# Patient Record
Sex: FEMALE | Race: WHITE | Employment: UNEMPLOYED | URBAN - METROPOLITAN AREA
[De-identification: names, ages, dates, MRNs, and addresses within clinical notes are randomized per-mention and may not be internally consistent; named-entity substitution may affect disease eponyms.]

---

## 2018-10-04 ENCOUNTER — APPOINTMENT (OUTPATIENT)
Dept: CT IMAGING | Age: 28
End: 2018-10-04
Payer: MEDICAID

## 2018-10-04 ENCOUNTER — HOSPITAL ENCOUNTER (EMERGENCY)
Age: 28
Discharge: HOME | End: 2018-10-04
Payer: MEDICAID

## 2018-10-04 VITALS
WEIGHT: 180 LBS | HEART RATE: 82 BPM | OXYGEN SATURATION: 99 % | HEIGHT: 66 IN | SYSTOLIC BLOOD PRESSURE: 148 MMHG | DIASTOLIC BLOOD PRESSURE: 99 MMHG | BODY MASS INDEX: 28.93 KG/M2 | TEMPERATURE: 98.7 F | RESPIRATION RATE: 20 BRPM

## 2018-10-04 DIAGNOSIS — S09.90XA INJURY OF HEAD, INITIAL ENCOUNTER: ICD-10-CM

## 2018-10-04 DIAGNOSIS — T14.8XXA HEMATOMA: ICD-10-CM
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
## 2018-10-04 LAB
CHP ED QC CHECK: YES
PREGNANCY TEST URINE, POC: NEGATIVE

## 2018-10-04 PROCEDURE — 70450 CT HEAD/BRAIN W/O DYE: CPT

## 2018-10-04 PROCEDURE — 6360000002 HC RX W HCPCS: Performed by: PHYSICIAN ASSISTANT

## 2018-10-04 PROCEDURE — 70486 CT MAXILLOFACIAL W/O DYE: CPT

## 2018-10-04 PROCEDURE — 99284 EMERGENCY DEPT VISIT MOD MDM: CPT

## 2018-10-04 PROCEDURE — 96375 TX/PRO/DX INJ NEW DRUG ADDON: CPT

## 2018-10-04 PROCEDURE — 96374 THER/PROPH/DIAG INJ IV PUSH: CPT

## 2018-10-04 PROCEDURE — 96376 TX/PRO/DX INJ SAME DRUG ADON: CPT

## 2018-10-04 RX ORDER — VENLAFAXINE 25 MG/1
25 TABLET ORAL 3 TIMES DAILY
COMMUNITY
End: 2020-08-06

## 2018-10-04 RX ORDER — ONDANSETRON 2 MG/ML
4 INJECTION INTRAMUSCULAR; INTRAVENOUS ONCE
Status: COMPLETED | OUTPATIENT
Start: 2018-10-04 | End: 2018-10-04

## 2018-10-04 RX ORDER — MORPHINE SULFATE 4 MG/ML
4 INJECTION, SOLUTION INTRAMUSCULAR; INTRAVENOUS ONCE
Status: COMPLETED | OUTPATIENT
Start: 2018-10-04 | End: 2018-10-04

## 2018-10-04 RX ORDER — HYDROCODONE BITARTRATE AND ACETAMINOPHEN 5; 325 MG/1; MG/1
1 TABLET ORAL EVERY 8 HOURS PRN
Qty: 9 TABLET | Refills: 0 | Status: SHIPPED | OUTPATIENT
Start: 2018-10-04 | End: 2018-10-11

## 2018-10-04 RX ADMIN — MORPHINE SULFATE 4 MG: 4 INJECTION, SOLUTION INTRAMUSCULAR; INTRAVENOUS at 20:09

## 2018-10-04 RX ADMIN — HYDROMORPHONE HYDROCHLORIDE 1 MG: 1 INJECTION, SOLUTION INTRAMUSCULAR; INTRAVENOUS; SUBCUTANEOUS at 19:27

## 2018-10-04 RX ADMIN — ONDANSETRON 4 MG: 2 INJECTION INTRAMUSCULAR; INTRAVENOUS at 20:09

## 2018-10-04 ASSESSMENT — VISUAL ACUITY
OS: 20/70
OD: 20/30

## 2018-10-04 ASSESSMENT — PAIN SCALES - GENERAL
PAINLEVEL_OUTOF10: 10
PAINLEVEL_OUTOF10: 10
PAINLEVEL_OUTOF10: 9

## 2018-10-04 ASSESSMENT — PAIN DESCRIPTION - FREQUENCY: FREQUENCY: CONTINUOUS

## 2018-10-04 ASSESSMENT — PAIN DESCRIPTION - PAIN TYPE: TYPE: ACUTE PAIN

## 2018-10-04 ASSESSMENT — ENCOUNTER SYMPTOMS
SHORTNESS OF BREATH: 0
DIARRHEA: 0
COUGH: 0
PHOTOPHOBIA: 0
VOMITING: 0
SORE THROAT: 0
NAUSEA: 0
RHINORRHEA: 0
EYE PAIN: 0
ABDOMINAL PAIN: 0
BACK PAIN: 0

## 2018-10-04 ASSESSMENT — PAIN DESCRIPTION - DESCRIPTORS: DESCRIPTORS: ACHING;THROBBING

## 2018-10-04 ASSESSMENT — PAIN DESCRIPTION - LOCATION: LOCATION: HEAD
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
## 2019-12-04 ENCOUNTER — OFFICE VISIT (OUTPATIENT)
Dept: OBGYN CLINIC | Age: 29
End: 2019-12-04
Payer: MEDICAID

## 2019-12-04 VITALS
HEART RATE: 98 BPM | BODY MASS INDEX: 22.76 KG/M2 | SYSTOLIC BLOOD PRESSURE: 102 MMHG | WEIGHT: 141 LBS | DIASTOLIC BLOOD PRESSURE: 60 MMHG

## 2019-12-04 DIAGNOSIS — Z30.46 NEXPLANON REMOVAL: Primary | ICD-10-CM

## 2019-12-04 PROCEDURE — 11982 REMOVE DRUG IMPLANT DEVICE: CPT | Performed by: OBSTETRICS & GYNECOLOGY

## 2019-12-04 RX ORDER — GABAPENTIN 400 MG/1
400 CAPSULE ORAL 3 TIMES DAILY
COMMUNITY
End: 2020-08-06

## 2019-12-04 RX ORDER — NORGESTIMATE AND ETHINYL ESTRADIOL 0.25-0.035
1 KIT ORAL DAILY
Qty: 1 PACKET | Refills: 11 | Status: SHIPPED | OUTPATIENT
Start: 2019-12-04 | End: 2020-08-06 | Stop reason: ALTCHOICE

## 2019-12-04 RX ORDER — BUSPIRONE HYDROCHLORIDE 5 MG/1
5 TABLET ORAL 3 TIMES DAILY
COMMUNITY
End: 2020-08-06

## 2019-12-04 RX ORDER — DULOXETIN HYDROCHLORIDE 60 MG/1
60 CAPSULE, DELAYED RELEASE ORAL DAILY
COMMUNITY
End: 2020-08-06

## 2019-12-04 ASSESSMENT — PATIENT HEALTH QUESTIONNAIRE - PHQ9
SUM OF ALL RESPONSES TO PHQ9 QUESTIONS 1 & 2: 0
1. LITTLE INTEREST OR PLEASURE IN DOING THINGS: 0
SUM OF ALL RESPONSES TO PHQ QUESTIONS 1-9: 0
SUM OF ALL RESPONSES TO PHQ QUESTIONS 1-9: 0
2. FEELING DOWN, DEPRESSED OR HOPELESS: 0

## 2020-06-21 ENCOUNTER — HOSPITAL ENCOUNTER (EMERGENCY)
Age: 30
Discharge: HOME | End: 2020-06-21
Payer: MEDICAID

## 2020-06-21 ENCOUNTER — APPOINTMENT (OUTPATIENT)
Dept: CT IMAGING | Age: 30
End: 2020-06-21
Payer: MEDICAID

## 2020-06-21 VITALS
SYSTOLIC BLOOD PRESSURE: 146 MMHG | BODY MASS INDEX: 20.83 KG/M2 | WEIGHT: 125 LBS | HEIGHT: 65 IN | DIASTOLIC BLOOD PRESSURE: 89 MMHG | HEART RATE: 86 BPM | RESPIRATION RATE: 16 BRPM | OXYGEN SATURATION: 100 % | TEMPERATURE: 98 F

## 2020-06-21 LAB
ALBUMIN SERPL-MCNC: 4.4 G/DL (ref 3.5–4.6)
ALP BLD-CCNC: 66 U/L (ref 40–130)
ALT SERPL-CCNC: 15 U/L (ref 0–33)
AMPHETAMINE SCREEN, URINE: ABNORMAL
ANION GAP SERPL CALCULATED.3IONS-SCNC: 15 MEQ/L (ref 9–15)
AST SERPL-CCNC: 23 U/L (ref 0–35)
BACTERIA: ABNORMAL /HPF
BARBITURATE SCREEN URINE: ABNORMAL
BASOPHILS ABSOLUTE: 0 K/UL (ref 0–0.2)
BASOPHILS RELATIVE PERCENT: 0.6 %
BENZODIAZEPINE SCREEN, URINE: ABNORMAL
BILIRUB SERPL-MCNC: <0.2 MG/DL (ref 0.2–0.7)
BILIRUBIN URINE: NEGATIVE
BLOOD, URINE: NEGATIVE
BUN BLDV-MCNC: 8 MG/DL (ref 6–20)
CALCIUM SERPL-MCNC: 9.4 MG/DL (ref 8.5–9.9)
CANNABINOID SCREEN URINE: ABNORMAL
CHLORIDE BLD-SCNC: 105 MEQ/L (ref 95–107)
CK MB: 3.3 NG/ML (ref 0–3.8)
CLARITY: CLEAR
CO2: 22 MEQ/L (ref 20–31)
COCAINE METABOLITE SCREEN URINE: POSITIVE
COLOR: YELLOW
CREAT SERPL-MCNC: 0.59 MG/DL (ref 0.5–0.9)
CREATINE KINASE-MB INDEX: 1.7 % (ref 0–3.5)
D DIMER: 1.14 MG/L FEU (ref 0–0.5)
EKG ATRIAL RATE: 106 BPM
EKG P AXIS: 46 DEGREES
EKG P-R INTERVAL: 136 MS
EKG Q-T INTERVAL: 346 MS
EKG QRS DURATION: 86 MS
EKG QTC CALCULATION (BAZETT): 459 MS
EKG R AXIS: 46 DEGREES
EKG T AXIS: 7 DEGREES
EKG VENTRICULAR RATE: 106 BPM
EOSINOPHILS ABSOLUTE: 0.1 K/UL (ref 0–0.7)
EOSINOPHILS RELATIVE PERCENT: 1.1 %
EPITHELIAL CELLS, UA: ABNORMAL /HPF (ref 0–5)
ETHANOL PERCENT: 0.1 G/DL
ETHANOL: 111 MG/DL (ref 0–0.08)
GFR AFRICAN AMERICAN: >60
GFR NON-AFRICAN AMERICAN: >60
GLOBULIN: 2.7 G/DL (ref 2.3–3.5)
GLUCOSE BLD-MCNC: 80 MG/DL (ref 70–99)
GLUCOSE URINE: NEGATIVE MG/DL
HCT VFR BLD CALC: 36.6 % (ref 37–47)
HEMOGLOBIN: 11.9 G/DL (ref 12–16)
HYALINE CASTS: ABNORMAL /HPF (ref 0–5)
KETONES, URINE: NEGATIVE MG/DL
LACTIC ACID: 3.2 MMOL/L (ref 0.5–2.2)
LEUKOCYTE ESTERASE, URINE: NEGATIVE
LYMPHOCYTES ABSOLUTE: 1.5 K/UL (ref 1–4.8)
LYMPHOCYTES RELATIVE PERCENT: 19.5 %
Lab: ABNORMAL
MCH RBC QN AUTO: 28.6 PG (ref 27–31.3)
MCHC RBC AUTO-ENTMCNC: 32.5 % (ref 33–37)
MCV RBC AUTO: 88 FL (ref 82–100)
METHADONE SCREEN, URINE: ABNORMAL
MONOCYTES ABSOLUTE: 0.7 K/UL (ref 0.2–0.8)
MONOCYTES RELATIVE PERCENT: 9.8 %
NEUTROPHILS ABSOLUTE: 5.1 K/UL (ref 1.4–6.5)
NEUTROPHILS RELATIVE PERCENT: 69 %
NITRITE, URINE: POSITIVE
OPIATE SCREEN URINE: ABNORMAL
OXYCODONE URINE: ABNORMAL
PDW BLD-RTO: 15.2 % (ref 11.5–14.5)
PH UA: 8 (ref 5–9)
PHENCYCLIDINE SCREEN URINE: ABNORMAL
PLATELET # BLD: 336 K/UL (ref 130–400)
POTASSIUM SERPL-SCNC: 3.9 MEQ/L (ref 3.4–4.9)
PROPOXYPHENE SCREEN: ABNORMAL
PROTEIN UA: NEGATIVE MG/DL
RBC # BLD: 4.16 M/UL (ref 4.2–5.4)
RBC UA: ABNORMAL /HPF (ref 0–5)
SODIUM BLD-SCNC: 142 MEQ/L (ref 135–144)
SPECIFIC GRAVITY UA: 1.01 (ref 1–1.03)
TOTAL CK: 196 U/L (ref 0–170)
TOTAL PROTEIN: 7.1 G/DL (ref 6.3–8)
TROPONIN: <0.01 NG/ML (ref 0–0.01)
URINE REFLEX TO CULTURE: ABNORMAL
UROBILINOGEN, URINE: 0.2 E.U./DL
WBC # BLD: 7.5 K/UL (ref 4.8–10.8)
WBC UA: ABNORMAL /HPF (ref 0–5)

## 2020-06-21 PROCEDURE — 6360000004 HC RX CONTRAST MEDICATION: Performed by: PHYSICIAN ASSISTANT

## 2020-06-21 PROCEDURE — 80053 COMPREHEN METABOLIC PANEL: CPT

## 2020-06-21 PROCEDURE — 6360000002 HC RX W HCPCS: Performed by: PHYSICIAN ASSISTANT

## 2020-06-21 PROCEDURE — 85025 COMPLETE CBC W/AUTO DIFF WBC: CPT

## 2020-06-21 PROCEDURE — 82553 CREATINE MB FRACTION: CPT

## 2020-06-21 PROCEDURE — 93010 ELECTROCARDIOGRAM REPORT: CPT | Performed by: INTERNAL MEDICINE

## 2020-06-21 PROCEDURE — 82550 ASSAY OF CK (CPK): CPT

## 2020-06-21 PROCEDURE — 2580000003 HC RX 258: Performed by: PHYSICIAN ASSISTANT

## 2020-06-21 PROCEDURE — 71275 CT ANGIOGRAPHY CHEST: CPT

## 2020-06-21 PROCEDURE — 70450 CT HEAD/BRAIN W/O DYE: CPT

## 2020-06-21 PROCEDURE — G0480 DRUG TEST DEF 1-7 CLASSES: HCPCS

## 2020-06-21 PROCEDURE — 36415 COLL VENOUS BLD VENIPUNCTURE: CPT

## 2020-06-21 PROCEDURE — 6370000000 HC RX 637 (ALT 250 FOR IP): Performed by: PHYSICIAN ASSISTANT

## 2020-06-21 PROCEDURE — 83605 ASSAY OF LACTIC ACID: CPT

## 2020-06-21 PROCEDURE — 85379 FIBRIN DEGRADATION QUANT: CPT

## 2020-06-21 PROCEDURE — 99285 EMERGENCY DEPT VISIT HI MDM: CPT

## 2020-06-21 PROCEDURE — 96360 HYDRATION IV INFUSION INIT: CPT

## 2020-06-21 PROCEDURE — 96361 HYDRATE IV INFUSION ADD-ON: CPT

## 2020-06-21 PROCEDURE — 96372 THER/PROPH/DIAG INJ SC/IM: CPT

## 2020-06-21 PROCEDURE — 93005 ELECTROCARDIOGRAM TRACING: CPT | Performed by: PHYSICIAN ASSISTANT

## 2020-06-21 PROCEDURE — 72128 CT CHEST SPINE W/O DYE: CPT

## 2020-06-21 PROCEDURE — 72125 CT NECK SPINE W/O DYE: CPT

## 2020-06-21 PROCEDURE — 84484 ASSAY OF TROPONIN QUANT: CPT

## 2020-06-21 PROCEDURE — 80307 DRUG TEST PRSMV CHEM ANLYZR: CPT

## 2020-06-21 PROCEDURE — 81001 URINALYSIS AUTO W/SCOPE: CPT

## 2020-06-21 RX ORDER — LORAZEPAM 2 MG/ML
1 INJECTION INTRAMUSCULAR ONCE
Status: COMPLETED | OUTPATIENT
Start: 2020-06-21 | End: 2020-06-21

## 2020-06-21 RX ORDER — 0.9 % SODIUM CHLORIDE 0.9 %
1000 INTRAVENOUS SOLUTION INTRAVENOUS ONCE
Status: COMPLETED | OUTPATIENT
Start: 2020-06-21 | End: 2020-06-21

## 2020-06-21 RX ORDER — NICOTINE 21 MG/24HR
1 PATCH, TRANSDERMAL 24 HOURS TRANSDERMAL ONCE
Status: DISCONTINUED | OUTPATIENT
Start: 2020-06-21 | End: 2020-06-22 | Stop reason: HOSPADM

## 2020-06-21 RX ADMIN — IOPAMIDOL 100 ML: 612 INJECTION, SOLUTION INTRAVENOUS at 16:56

## 2020-06-21 RX ADMIN — SODIUM CHLORIDE 1000 ML: 9 INJECTION, SOLUTION INTRAVENOUS at 15:59

## 2020-06-21 RX ADMIN — LORAZEPAM 1 MG: 2 INJECTION, SOLUTION INTRAMUSCULAR; INTRAVENOUS at 22:00

## 2020-06-21 RX ADMIN — SODIUM CHLORIDE 1000 ML: 9 INJECTION, SOLUTION INTRAVENOUS at 18:20

## 2020-06-21 ASSESSMENT — ENCOUNTER SYMPTOMS
ALLERGIC/IMMUNOLOGIC NEGATIVE: 1
ABDOMINAL PAIN: 0
COLOR CHANGE: 0
VOMITING: 0
BACK PAIN: 1
SHORTNESS OF BREATH: 1
TROUBLE SWALLOWING: 0
EYE PAIN: 0
DIARRHEA: 0
APNEA: 0

## 2020-06-21 ASSESSMENT — PAIN DESCRIPTION - DESCRIPTORS: DESCRIPTORS: TENDER

## 2020-06-21 ASSESSMENT — PAIN SCALES - GENERAL: PAINLEVEL_OUTOF10: 5

## 2020-06-21 ASSESSMENT — PAIN DESCRIPTION - LOCATION: LOCATION: NECK
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
## 2020-06-21 NOTE — ED NOTES
Bed: 21  Expected date: 6/21/20  Expected time: 3:25 PM  Means of arrival:   Comments:  SLFD bringing 30F, difficulty breathing, possible anxiety attack with history of panic attacks, A & Ox4.  Drank a 5th of vodka since yesterday and took some Fentanyl, IV established, 140/90, 115, 20, 99%, OT 99% YOSHI Mcneill, RN  06/21/20 1915
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
## 2020-06-22 ENCOUNTER — CARE COORDINATION (OUTPATIENT)
Dept: CARE COORDINATION | Age: 30
End: 2020-06-22

## 2020-06-22 NOTE — ED NOTES
Patient appears to be resting comfortably. Nicotine patch applied per orders. Patient requesting for her IV to be pulled and to get dressed. RASHID Jarvis notified. States IV can be dc'd. IV DC'd per verbal orders. Patient denies any other needs or concerns at this time. Vitals are stable. Call light within reach. Will continue to monitor.       Hector Vogel RN  06/21/20 2017

## 2020-06-23 ENCOUNTER — CARE COORDINATION (OUTPATIENT)
Dept: CARE COORDINATION | Age: 30
End: 2020-06-23

## 2020-08-04 ENCOUNTER — OFFICE VISIT (OUTPATIENT)
Dept: SURGERY | Age: 30
End: 2020-08-04
Payer: MEDICAID

## 2020-08-04 VITALS
TEMPERATURE: 96.9 F | HEIGHT: 65 IN | OXYGEN SATURATION: 97 % | WEIGHT: 145 LBS | BODY MASS INDEX: 24.16 KG/M2 | HEART RATE: 70 BPM

## 2020-08-04 PROCEDURE — 99203 OFFICE O/P NEW LOW 30 MIN: CPT | Performed by: COLON & RECTAL SURGERY

## 2020-08-04 PROCEDURE — G8420 CALC BMI NORM PARAMETERS: HCPCS | Performed by: COLON & RECTAL SURGERY

## 2020-08-04 PROCEDURE — 1036F TOBACCO NON-USER: CPT | Performed by: COLON & RECTAL SURGERY

## 2020-08-04 PROCEDURE — G8427 DOCREV CUR MEDS BY ELIG CLIN: HCPCS | Performed by: COLON & RECTAL SURGERY

## 2020-08-04 ASSESSMENT — ENCOUNTER SYMPTOMS
VOMITING: 0
ABDOMINAL PAIN: 0
SHORTNESS OF BREATH: 0
CHEST TIGHTNESS: 0
DIARRHEA: 0
COLOR CHANGE: 0
CONSTIPATION: 0

## 2020-08-06 ENCOUNTER — NURSE ONLY (OUTPATIENT)
Dept: PRIMARY CARE CLINIC | Age: 30
End: 2020-08-06

## 2020-08-06 ENCOUNTER — HOSPITAL ENCOUNTER (OUTPATIENT)
Dept: PREADMISSION TESTING | Age: 30
Discharge: HOME | End: 2020-08-10
Payer: MEDICAID

## 2020-08-06 VITALS
HEIGHT: 66 IN | OXYGEN SATURATION: 98 % | HEART RATE: 81 BPM | WEIGHT: 145.2 LBS | DIASTOLIC BLOOD PRESSURE: 68 MMHG | RESPIRATION RATE: 16 BRPM | SYSTOLIC BLOOD PRESSURE: 115 MMHG | BODY MASS INDEX: 23.33 KG/M2 | TEMPERATURE: 99.5 F

## 2020-08-06 LAB
ANION GAP SERPL CALCULATED.3IONS-SCNC: 7 MEQ/L (ref 9–15)
BUN BLDV-MCNC: 11 MG/DL (ref 6–20)
CALCIUM SERPL-MCNC: 9.4 MG/DL (ref 8.5–9.9)
CHLORIDE BLD-SCNC: 100 MEQ/L (ref 95–107)
CO2: 30 MEQ/L (ref 20–31)
CREAT SERPL-MCNC: 0.78 MG/DL (ref 0.5–0.9)
GFR AFRICAN AMERICAN: >60
GFR NON-AFRICAN AMERICAN: >60
GLUCOSE BLD-MCNC: 81 MG/DL (ref 70–99)
HCT VFR BLD CALC: 38.6 % (ref 37–47)
HEMOGLOBIN: 12.8 G/DL (ref 12–16)
MCH RBC QN AUTO: 29.6 PG (ref 27–31.3)
MCHC RBC AUTO-ENTMCNC: 33.3 % (ref 33–37)
MCV RBC AUTO: 89 FL (ref 82–100)
PDW BLD-RTO: 15.8 % (ref 11.5–14.5)
PLATELET # BLD: 352 K/UL (ref 130–400)
POTASSIUM SERPL-SCNC: 4.1 MEQ/L (ref 3.4–4.9)
RBC # BLD: 4.34 M/UL (ref 4.2–5.4)
SODIUM BLD-SCNC: 137 MEQ/L (ref 135–144)
WBC # BLD: 7.2 K/UL (ref 4.8–10.8)

## 2020-08-06 PROCEDURE — 80048 BASIC METABOLIC PNL TOTAL CA: CPT

## 2020-08-06 PROCEDURE — 85027 COMPLETE CBC AUTOMATED: CPT

## 2020-08-06 PROCEDURE — U0003 INFECTIOUS AGENT DETECTION BY NUCLEIC ACID (DNA OR RNA); SEVERE ACUTE RESPIRATORY SYNDROME CORONAVIRUS 2 (SARS-COV-2) (CORONAVIRUS DISEASE [COVID-19]), AMPLIFIED PROBE TECHNIQUE, MAKING USE OF HIGH THROUGHPUT TECHNOLOGIES AS DESCRIBED BY CMS-2020-01-R: HCPCS

## 2020-08-06 RX ORDER — CEFAZOLIN SODIUM 2 G/50ML
2 SOLUTION INTRAVENOUS ONCE
Status: CANCELLED | OUTPATIENT
Start: 2020-08-12

## 2020-08-06 RX ORDER — SODIUM CHLORIDE 0.9 % (FLUSH) 0.9 %
10 SYRINGE (ML) INJECTION PRN
Status: CANCELLED | OUTPATIENT
Start: 2020-08-12

## 2020-08-06 RX ORDER — SODIUM CHLORIDE, SODIUM LACTATE, POTASSIUM CHLORIDE, CALCIUM CHLORIDE 600; 310; 30; 20 MG/100ML; MG/100ML; MG/100ML; MG/100ML
INJECTION, SOLUTION INTRAVENOUS CONTINUOUS
Status: CANCELLED | OUTPATIENT
Start: 2020-08-12

## 2020-08-06 RX ORDER — SODIUM CHLORIDE 0.9 % (FLUSH) 0.9 %
10 SYRINGE (ML) INJECTION EVERY 12 HOURS SCHEDULED
Status: CANCELLED | OUTPATIENT
Start: 2020-08-12

## 2020-08-06 RX ORDER — LIDOCAINE HYDROCHLORIDE 10 MG/ML
1 INJECTION, SOLUTION EPIDURAL; INFILTRATION; INTRACAUDAL; PERINEURAL
Status: CANCELLED | OUTPATIENT
Start: 2020-08-12 | End: 2020-08-12

## 2020-08-06 RX ORDER — LOSARTAN POTASSIUM 50 MG/1
50 TABLET ORAL DAILY
COMMUNITY
End: 2022-03-10

## 2020-08-06 NOTE — PROGRESS NOTES
Pt states her medications will run out soon (they were prescribed at treatment center). Does not have PCP, given a pamphlet of [employer] with phone numbers, urged to establish care.

## 2020-08-09 LAB
SARS-COV-2: NOT DETECTED
SOURCE: NORMAL

## 2020-08-12 ENCOUNTER — HOSPITAL ENCOUNTER (OUTPATIENT)
Age: 30
Setting detail: OUTPATIENT SURGERY
Discharge: HOME | End: 2020-08-12
Attending: COLON & RECTAL SURGERY | Admitting: COLON & RECTAL SURGERY
Payer: MEDICAID

## 2020-08-12 ENCOUNTER — ANESTHESIA (OUTPATIENT)
Dept: OPERATING ROOM | Age: 30
End: 2020-08-12
Payer: MEDICAID

## 2020-08-12 ENCOUNTER — ANESTHESIA EVENT (OUTPATIENT)
Dept: OPERATING ROOM | Age: 30
End: 2020-08-12
Payer: MEDICAID

## 2020-08-12 VITALS — DIASTOLIC BLOOD PRESSURE: 54 MMHG | SYSTOLIC BLOOD PRESSURE: 93 MMHG | OXYGEN SATURATION: 97 %

## 2020-08-12 VITALS
RESPIRATION RATE: 16 BRPM | DIASTOLIC BLOOD PRESSURE: 75 MMHG | OXYGEN SATURATION: 94 % | BODY MASS INDEX: 23.33 KG/M2 | HEART RATE: 76 BPM | WEIGHT: 145.2 LBS | HEIGHT: 66 IN | SYSTOLIC BLOOD PRESSURE: 115 MMHG | TEMPERATURE: 98 F

## 2020-08-12 LAB
AMPHETAMINE SCREEN, URINE: NORMAL
BARBITURATE SCREEN URINE: NORMAL
BENZODIAZEPINE SCREEN, URINE: NORMAL
CANNABINOID SCREEN URINE: NORMAL
COCAINE METABOLITE SCREEN URINE: NORMAL
HCG, URINE, POC: NEGATIVE
Lab: NORMAL
Lab: NORMAL
METHADONE SCREEN, URINE: NORMAL
NEGATIVE QC PASS/FAIL: NORMAL
OPIATE SCREEN URINE: NORMAL
OXYCODONE URINE: NORMAL
PHENCYCLIDINE SCREEN URINE: NORMAL
POSITIVE QC PASS/FAIL: NORMAL
PROPOXYPHENE SCREEN: NORMAL

## 2020-08-12 PROCEDURE — 7100000001 HC PACU RECOVERY - ADDTL 15 MIN: Performed by: COLON & RECTAL SURGERY

## 2020-08-12 PROCEDURE — 6360000002 HC RX W HCPCS: Performed by: NURSE ANESTHETIST, CERTIFIED REGISTERED

## 2020-08-12 PROCEDURE — 2580000003 HC RX 258: Performed by: NURSE PRACTITIONER

## 2020-08-12 PROCEDURE — 6360000002 HC RX W HCPCS: Performed by: COLON & RECTAL SURGERY

## 2020-08-12 PROCEDURE — 3700000001 HC ADD 15 MINUTES (ANESTHESIA): Performed by: COLON & RECTAL SURGERY

## 2020-08-12 PROCEDURE — 3600000004 HC SURGERY LEVEL 4 BASE: Performed by: COLON & RECTAL SURGERY

## 2020-08-12 PROCEDURE — 88304 TISSUE EXAM BY PATHOLOGIST: CPT

## 2020-08-12 PROCEDURE — 6360000002 HC RX W HCPCS: Performed by: ANESTHESIOLOGY

## 2020-08-12 PROCEDURE — 11404 EXC TR-EXT B9+MARG 3.1-4 CM: CPT | Performed by: COLON & RECTAL SURGERY

## 2020-08-12 PROCEDURE — 12032 INTMD RPR S/A/T/EXT 2.6-7.5: CPT | Performed by: COLON & RECTAL SURGERY

## 2020-08-12 PROCEDURE — 7100000010 HC PHASE II RECOVERY - FIRST 15 MIN: Performed by: COLON & RECTAL SURGERY

## 2020-08-12 PROCEDURE — 3600000014 HC SURGERY LEVEL 4 ADDTL 15MIN: Performed by: COLON & RECTAL SURGERY

## 2020-08-12 PROCEDURE — 6370000000 HC RX 637 (ALT 250 FOR IP): Performed by: ANESTHESIOLOGY

## 2020-08-12 PROCEDURE — 2580000003 HC RX 258: Performed by: COLON & RECTAL SURGERY

## 2020-08-12 PROCEDURE — 7100000000 HC PACU RECOVERY - FIRST 15 MIN: Performed by: COLON & RECTAL SURGERY

## 2020-08-12 PROCEDURE — 3700000000 HC ANESTHESIA ATTENDED CARE: Performed by: COLON & RECTAL SURGERY

## 2020-08-12 PROCEDURE — 2500000003 HC RX 250 WO HCPCS: Performed by: COLON & RECTAL SURGERY

## 2020-08-12 PROCEDURE — 2709999900 HC NON-CHARGEABLE SUPPLY: Performed by: COLON & RECTAL SURGERY

## 2020-08-12 PROCEDURE — 80307 DRUG TEST PRSMV CHEM ANLYZR: CPT

## 2020-08-12 RX ORDER — FENTANYL CITRATE 50 UG/ML
INJECTION, SOLUTION INTRAMUSCULAR; INTRAVENOUS PRN
Status: DISCONTINUED | OUTPATIENT
Start: 2020-08-12 | End: 2020-08-12 | Stop reason: SDUPTHER

## 2020-08-12 RX ORDER — ONDANSETRON 2 MG/ML
4 INJECTION INTRAMUSCULAR; INTRAVENOUS
Status: DISCONTINUED | OUTPATIENT
Start: 2020-08-12 | End: 2020-08-12 | Stop reason: HOSPADM

## 2020-08-12 RX ORDER — LIDOCAINE HYDROCHLORIDE 10 MG/ML
1 INJECTION, SOLUTION EPIDURAL; INFILTRATION; INTRACAUDAL; PERINEURAL
Status: COMPLETED | OUTPATIENT
Start: 2020-08-12 | End: 2020-08-12

## 2020-08-12 RX ORDER — MEPERIDINE HYDROCHLORIDE 25 MG/ML
12.5 INJECTION INTRAMUSCULAR; INTRAVENOUS; SUBCUTANEOUS EVERY 5 MIN PRN
Status: DISCONTINUED | OUTPATIENT
Start: 2020-08-12 | End: 2020-08-12 | Stop reason: HOSPADM

## 2020-08-12 RX ORDER — LIDOCAINE HYDROCHLORIDE 20 MG/ML
INJECTION, SOLUTION INTRAVENOUS PRN
Status: DISCONTINUED | OUTPATIENT
Start: 2020-08-12 | End: 2020-08-12 | Stop reason: SDUPTHER

## 2020-08-12 RX ORDER — SODIUM CHLORIDE 0.9 % (FLUSH) 0.9 %
10 SYRINGE (ML) INJECTION PRN
Status: DISCONTINUED | OUTPATIENT
Start: 2020-08-12 | End: 2020-08-12 | Stop reason: HOSPADM

## 2020-08-12 RX ORDER — METOCLOPRAMIDE HYDROCHLORIDE 5 MG/ML
10 INJECTION INTRAMUSCULAR; INTRAVENOUS
Status: DISCONTINUED | OUTPATIENT
Start: 2020-08-12 | End: 2020-08-12 | Stop reason: HOSPADM

## 2020-08-12 RX ORDER — DEXAMETHASONE SODIUM PHOSPHATE 4 MG/ML
INJECTION, SOLUTION INTRA-ARTICULAR; INTRALESIONAL; INTRAMUSCULAR; INTRAVENOUS; SOFT TISSUE PRN
Status: DISCONTINUED | OUTPATIENT
Start: 2020-08-12 | End: 2020-08-12 | Stop reason: SDUPTHER

## 2020-08-12 RX ORDER — ONDANSETRON 2 MG/ML
INJECTION INTRAMUSCULAR; INTRAVENOUS PRN
Status: DISCONTINUED | OUTPATIENT
Start: 2020-08-12 | End: 2020-08-12 | Stop reason: SDUPTHER

## 2020-08-12 RX ORDER — SODIUM CHLORIDE, SODIUM LACTATE, POTASSIUM CHLORIDE, CALCIUM CHLORIDE 600; 310; 30; 20 MG/100ML; MG/100ML; MG/100ML; MG/100ML
INJECTION, SOLUTION INTRAVENOUS CONTINUOUS
Status: DISCONTINUED | OUTPATIENT
Start: 2020-08-12 | End: 2020-08-12 | Stop reason: HOSPADM

## 2020-08-12 RX ORDER — HYDROCODONE BITARTRATE AND ACETAMINOPHEN 5; 325 MG/1; MG/1
1 TABLET ORAL PRN
Status: COMPLETED | OUTPATIENT
Start: 2020-08-12 | End: 2020-08-12

## 2020-08-12 RX ORDER — MAGNESIUM HYDROXIDE 1200 MG/15ML
LIQUID ORAL CONTINUOUS PRN
Status: COMPLETED | OUTPATIENT
Start: 2020-08-12 | End: 2020-08-12

## 2020-08-12 RX ORDER — PROPOFOL 10 MG/ML
INJECTION, EMULSION INTRAVENOUS PRN
Status: DISCONTINUED | OUTPATIENT
Start: 2020-08-12 | End: 2020-08-12 | Stop reason: SDUPTHER

## 2020-08-12 RX ORDER — HYDROCODONE BITARTRATE AND ACETAMINOPHEN 5; 325 MG/1; MG/1
2 TABLET ORAL PRN
Status: COMPLETED | OUTPATIENT
Start: 2020-08-12 | End: 2020-08-12

## 2020-08-12 RX ORDER — DIPHENHYDRAMINE HYDROCHLORIDE 50 MG/ML
12.5 INJECTION INTRAMUSCULAR; INTRAVENOUS
Status: DISCONTINUED | OUTPATIENT
Start: 2020-08-12 | End: 2020-08-12 | Stop reason: HOSPADM

## 2020-08-12 RX ORDER — OXYCODONE HYDROCHLORIDE AND ACETAMINOPHEN 5; 325 MG/1; MG/1
1 TABLET ORAL EVERY 6 HOURS PRN
Qty: 10 TABLET | Refills: 0 | Status: SHIPPED | OUTPATIENT
Start: 2020-08-12 | End: 2020-08-15

## 2020-08-12 RX ORDER — SODIUM CHLORIDE 0.9 % (FLUSH) 0.9 %
10 SYRINGE (ML) INJECTION EVERY 12 HOURS SCHEDULED
Status: DISCONTINUED | OUTPATIENT
Start: 2020-08-12 | End: 2020-08-12 | Stop reason: HOSPADM

## 2020-08-12 RX ORDER — MIDAZOLAM HYDROCHLORIDE 1 MG/ML
INJECTION INTRAMUSCULAR; INTRAVENOUS PRN
Status: DISCONTINUED | OUTPATIENT
Start: 2020-08-12 | End: 2020-08-12 | Stop reason: SDUPTHER

## 2020-08-12 RX ORDER — BUPIVACAINE HYDROCHLORIDE AND EPINEPHRINE 2.5; 5 MG/ML; UG/ML
INJECTION, SOLUTION EPIDURAL; INFILTRATION; INTRACAUDAL; PERINEURAL PRN
Status: DISCONTINUED | OUTPATIENT
Start: 2020-08-12 | End: 2020-08-12 | Stop reason: ALTCHOICE

## 2020-08-12 RX ORDER — FENTANYL CITRATE 50 UG/ML
50 INJECTION, SOLUTION INTRAMUSCULAR; INTRAVENOUS EVERY 10 MIN PRN
Status: DISCONTINUED | OUTPATIENT
Start: 2020-08-12 | End: 2020-08-12 | Stop reason: HOSPADM

## 2020-08-12 RX ORDER — CEFAZOLIN SODIUM 2 G/50ML
2 SOLUTION INTRAVENOUS ONCE
Status: COMPLETED | OUTPATIENT
Start: 2020-08-12 | End: 2020-08-12

## 2020-08-12 RX ADMIN — FENTANYL CITRATE 100 MCG: 50 INJECTION, SOLUTION INTRAMUSCULAR; INTRAVENOUS at 08:07

## 2020-08-12 RX ADMIN — LIDOCAINE HYDROCHLORIDE 0.1 ML: 10 INJECTION, SOLUTION EPIDURAL; INFILTRATION; INTRACAUDAL; PERINEURAL at 06:56

## 2020-08-12 RX ADMIN — FENTANYL CITRATE 50 MCG: 50 INJECTION, SOLUTION INTRAMUSCULAR; INTRAVENOUS at 09:12

## 2020-08-12 RX ADMIN — CEFAZOLIN SODIUM 2 G: 2 SOLUTION INTRAVENOUS at 07:57

## 2020-08-12 RX ADMIN — MEPERIDINE HYDROCHLORIDE 12.5 MG: 25 INJECTION, SOLUTION INTRAMUSCULAR; INTRAVENOUS; SUBCUTANEOUS at 08:52

## 2020-08-12 RX ADMIN — DEXAMETHASONE SODIUM PHOSPHATE 4 MG: 4 INJECTION INTRA-ARTICULAR; INTRALESIONAL; INTRAMUSCULAR; INTRAVENOUS; SOFT TISSUE at 08:01

## 2020-08-12 RX ADMIN — HYDROCODONE BITARTRATE AND ACETAMINOPHEN 1 TABLET: 5; 325 TABLET ORAL at 09:33

## 2020-08-12 RX ADMIN — MIDAZOLAM HYDROCHLORIDE 2 MG: 2 INJECTION, SOLUTION INTRAMUSCULAR; INTRAVENOUS at 07:56

## 2020-08-12 RX ADMIN — LIDOCAINE HYDROCHLORIDE 100 MG: 20 INJECTION, SOLUTION INTRAVENOUS at 08:01

## 2020-08-12 RX ADMIN — MEPERIDINE HYDROCHLORIDE 12.5 MG: 25 INJECTION, SOLUTION INTRAMUSCULAR; INTRAVENOUS; SUBCUTANEOUS at 09:03

## 2020-08-12 RX ADMIN — ONDANSETRON 4 MG: 2 INJECTION INTRAMUSCULAR; INTRAVENOUS at 08:01

## 2020-08-12 RX ADMIN — SODIUM CHLORIDE, POTASSIUM CHLORIDE, SODIUM LACTATE AND CALCIUM CHLORIDE: 600; 310; 30; 20 INJECTION, SOLUTION INTRAVENOUS at 07:49

## 2020-08-12 RX ADMIN — SODIUM CHLORIDE, POTASSIUM CHLORIDE, SODIUM LACTATE AND CALCIUM CHLORIDE: 600; 310; 30; 20 INJECTION, SOLUTION INTRAVENOUS at 06:58

## 2020-08-12 RX ADMIN — PROPOFOL 200 MG: 10 INJECTION, EMULSION INTRAVENOUS at 08:01

## 2020-08-12 ASSESSMENT — PULMONARY FUNCTION TESTS
PIF_VALUE: 3
PIF_VALUE: 1
PIF_VALUE: 2
PIF_VALUE: 11
PIF_VALUE: 13
PIF_VALUE: 3
PIF_VALUE: 1
PIF_VALUE: 3
PIF_VALUE: 13
PIF_VALUE: 3
PIF_VALUE: 3
PIF_VALUE: 1
PIF_VALUE: 3
PIF_VALUE: 0
PIF_VALUE: 1
PIF_VALUE: 3
PIF_VALUE: 3
PIF_VALUE: 10
PIF_VALUE: 0
PIF_VALUE: 3
PIF_VALUE: 3
PIF_VALUE: 5
PIF_VALUE: 23
PIF_VALUE: 3
PIF_VALUE: 1
PIF_VALUE: 1
PIF_VALUE: 3
PIF_VALUE: 3
PIF_VALUE: 1
PIF_VALUE: 3
PIF_VALUE: 18
PIF_VALUE: 4
PIF_VALUE: 12
PIF_VALUE: 19
PIF_VALUE: 3
PIF_VALUE: 3

## 2020-08-12 ASSESSMENT — PAIN SCALES - GENERAL
PAINLEVEL_OUTOF10: 3
PAINLEVEL_OUTOF10: 7
PAINLEVEL_OUTOF10: 3
PAINLEVEL_OUTOF10: 7
PAINLEVEL_OUTOF10: 7
PAINLEVEL_OUTOF10: 3
PAINLEVEL_OUTOF10: 7
PAINLEVEL_OUTOF10: 7

## 2020-08-12 ASSESSMENT — PAIN DESCRIPTION - DESCRIPTORS: DESCRIPTORS: THROBBING

## 2020-08-12 ASSESSMENT — PAIN - FUNCTIONAL ASSESSMENT: PAIN_FUNCTIONAL_ASSESSMENT: 0-10

## 2020-08-12 NOTE — BRIEF OP NOTE
Department of General Surgery - Adult  Surgical Service general surgery  Brief Operative Report      Pre-operative Diagnosis: Abdominal wall lipoma    Post-operative Diagnosis: Abdominal wall epidermal inclusion cyst    Procedure: Excision abdominal wall epidermal inclusion cyst    Surgeon: Laura Jolly     Assistant(s):  Pastora    Anesthesia:  General endotrachial anesthesia and Local anesthesia    Estimated blood loss: 10    Total Urine Output: Not recorded     Drains: None    Specimens: Abdominal wall epidermal inclusion cyst    Implants: None    Findings: 6 x 6 cm epidermal inclusion cyst abdominal wall    Complications: None    Condition:  stable    See dictated operative report for full details.

## 2020-08-12 NOTE — ANESTHESIA POSTPROCEDURE EVALUATION
Department of Anesthesiology  Postprocedure Note    Patient: Bettye Tarango  MRN: [id number]  YOB: 1990  Date of evaluation: 8/12/2020  Time:  8:41 AM     Procedure Summary     Date:  08/12/20 Room / Location:  [address]    Anesthesia Start:  4750 Anesthesia Stop:  0840    Procedure:  EXCISION ABDOMINAL WALL CYST (N/A Abdomen) Diagnosis:  (ABDOMINAL WALL LIPOMA)    Surgeon:  Prem Ramsay MD Responsible Provider:  Severa Heather, APRN - CRNA    Anesthesia Type:  general ASA Status:  2          Anesthesia Type: general    Taras Phase I:      Taras Phase II:      Last vitals: Reviewed and per EMR flowsheets.        Anesthesia Post Evaluation    Patient location during evaluation: bedside  Patient participation: complete - patient participated  Airway patency: patent  Nausea & Vomiting: no nausea and no vomiting  Complications: no  Cardiovascular status: blood pressure returned to baseline  Respiratory status: acceptable  Hydration status: euvolemic

## 2020-08-12 NOTE — ANESTHESIA PRE PROCEDURE
Department of Anesthesiology  Preprocedure Note       Name:  Negrito Green   Age:  35 y.o.  :  1990                                          MRN:  [id number]         Date:  2020      Surgeon: Isaac Hernandez):  Chinedu Neal MD    Procedure: Procedure(s):  EXCISION ABDOMINAL WALL LIPOMA    Medications prior to admission:   Prior to Admission medications    Medication Sig Start Date End Date Taking?  Authorizing Provider   sertraline (ZOLOFT) 50 MG tablet Take 50 mg by mouth daily   Yes Historical Provider, MD   losartan (COZAAR) 50 MG tablet Take 50 mg by mouth daily   Yes Historical Provider, MD       Current medications:    Current Facility-Administered Medications   Medication Dose Route Frequency Provider Last Rate Last Dose    lactated ringers infusion   Intravenous Continuous JAMAR Higuera  mL/hr at 20 0658      sodium chloride flush 0.9 % injection 10 mL  10 mL Intravenous 2 times per day JAMAR Higuera CNP        sodium chloride flush 0.9 % injection 10 mL  10 mL Intravenous PRN JAMAR Higuera CNP        ceFAZolin (ANCEF) 2 g in dextrose 3 % 50 mL IVPB (duplex)  2 g Intravenous Once Chinedu Neal MD        fentaNYL (SUBLIMAZE) injection 50 mcg  50 mcg Intravenous Q10 Min PRN Khadra Alanis MD        HYDROmorphone (DILAUDID) injection 0.5 mg  0.5 mg Intravenous Q10 Min PRN Khadra Alanis MD        HYDROcodone-acetaminophen [facility]) 5-325 MG per tablet 1 tablet  1 tablet Oral PRN Khadra Alanis MD        Or    HYDROcodone-acetaminophen [facility]) 5-325 MG per tablet 2 tablet  2 tablet Oral PRN Khadra Alanis MD        diphenhydrAMINE (BENADRYL) injection 12.5 mg  12.5 mg Intravenous Once PRN Khadra Alanis MD        ondansetron [facility]) injection 4 mg  4 mg Intravenous Once PRN Khadra Alanis MD        metoclopramide [facility]) injection 10 mg  10 mg Intravenous Once PRN Jc Fuentes Kayy Dietz MD        meperidine (DEMEROL) injection 12.5 mg  12.5 mg Intravenous Q5 Min PRN Eduar Shin MD           Allergies:  No Known Allergies    Problem List:    Patient Active Problem List   Diagnosis Code    Back pain M54.9    Wrist pain, left M25.532    Lumbosacral spondylosis without myelopathy M47.817    Trochanteric bursitis of right hip M70.61       Past Medical History:        Diagnosis Date    Bladder infection     Chronic back pain     Hypertension     Scoliosis        Past Surgical History:  No past surgical history on file. Social History:    Social History     Tobacco Use    Smoking status: Current Every Day Smoker     Packs/day: 0.50     Types: Cigarettes    Smokeless tobacco: Never Used    Tobacco comment: socially   Substance Use Topics    Alcohol use: No     Alcohol/week: 0.0 standard drinks                                Ready to quit: Not Answered  Counseling given: Not Answered  Comment: socially      Vital Signs (Current):   Vitals:    08/12/20 0701   BP: 110/77   Pulse: 69   Resp: 16   Temp: 98 °F (36.7 °C)   TempSrc: Temporal   SpO2: 99%   Weight: 145 lb 3.2 oz (65.9 kg)   Height: 5' 6\" (1.676 m)                                              BP Readings from Last 3 Encounters:   08/12/20 110/77   08/06/20 115/68   06/21/20 (!) 146/89       NPO Status: Time of last liquid consumption: 0000                        Time of last solid consumption: 2300                        Date of last liquid consumption: 08/12/20                        Date of last solid food consumption: 08/11/20    BMI:   Wt Readings from Last 3 Encounters:   08/12/20 145 lb 3.2 oz (65.9 kg)   08/06/20 145 lb 3.2 oz (65.9 kg)   08/04/20 145 lb (65.8 kg)     Body mass index is 23.44 kg/m².     CBC:   Lab Results   Component Value Date    WBC 7.2 08/06/2020    RBC 4.34 08/06/2020    HGB 12.8 08/06/2020    HCT 38.6 08/06/2020    MCV 89.0 08/06/2020    RDW 15.8 08/06/2020     08/06/2020 CMP:   Lab Results   Component Value Date     08/06/2020    K 4.1 08/06/2020     08/06/2020    CO2 30 08/06/2020    BUN 11 08/06/2020    CREATININE 0.78 08/06/2020    GFRAA >60.0 08/06/2020    LABGLOM >60.0 08/06/2020    GLUCOSE 81 08/06/2020    PROT 7.1 06/21/2020    CALCIUM 9.4 08/06/2020    BILITOT <0.2 06/21/2020    ALKPHOS 66 06/21/2020    AST 23 06/21/2020    ALT 15 06/21/2020       POC Tests: No results for input(s): POCGLU, POCNA, POCK, POCCL, POCBUN, POCHEMO, POCHCT in the last 72 hours. Coags: No results found for: PROTIME, INR, APTT    HCG (If Applicable):   Lab Results   Component Value Date    PREGTESTUR negative 10/04/2018        ABGs: No results found for: PHART, PO2ART, KYF4VJZ, RGP1BIQ, BEART, H6AXPHYI     Type & Screen (If Applicable):  No results found for: LABABO, LABRH    Drug/Infectious Status (If Applicable):  No results found for: HIV, HEPCAB    COVID-19 Screening (If Applicable):   Lab Results   Component Value Date    COVID19 Not Detected 08/06/2020         Anesthesia Evaluation  Patient summary reviewed and Nursing notes reviewed no history of anesthetic complications:   Airway: Mallampati: II  TM distance: >3 FB   Neck ROM: full  Mouth opening: > = 3 FB Dental: normal exam         Pulmonary:Negative Pulmonary ROS and normal exam                               Cardiovascular:    (+) hypertension:,          Beta Blocker:  Dose within 24 Hrs         Neuro/Psych:   Negative Neuro/Psych ROS              GI/Hepatic/Renal: Neg GI/Hepatic/Renal ROS            Endo/Other: Negative Endo/Other ROS                    Abdominal:           Vascular: negative vascular ROS. Anesthesia Plan      general     ASA 2       Induction: intravenous. MIPS: Prophylactic antiemetics administered. Anesthetic plan and risks discussed with patient. Plan discussed with CRNA.     Attending anesthesiologist reviewed and agrees with Pre Eval

## 2020-08-12 NOTE — OP NOTE
Monika Chris [address]                      [address], [address] [facility]                                OPERATIVE REPORT    PATIENT NAME: Renuka Obando                  :        1990  MED REC NO:   [id number]                            ROOM:  ACCOUNT NO:   [de-identified]                           ADMIT DATE: 2020  PROVIDER:     Aminata Alvarado MD    DATE OF PROCEDURE:  2020    PREOPERATIVE DIAGNOSIS:  Abdominal wall lipoma. POSTOPERATIVE DIAGNOSIS:  Abdominal wall epidermal inclusion cyst.    PROCEDURE:  Excision of abdominal wall epidermal inclusion cyst.    SURGEON:  Aminata Alvarado MD    ASSISTANT:  Ms. Arun Tarango. ANESTHESIA:  General anesthesia with local.    SPECIMEN:  Epidermal inclusion cyst.    ESTIMATED BLOOD LOSS:  20 mL. COMPLICATIONS:  None. INDICATIONS:  A 41-year-old female with an enlarging abdominal wall  mass, left upper quadrant. Risks and benefits of excision under  sedation were described. Risks of infection, bleeding, recurrence were  all outlined. Postoperative pain was discussed given her previous drug  use history. Despite these risks, she wished to proceed. Consent  obtained. OPERATIVE PROCEDURE:  She was taken to the operating room, placed in the  supine position. General anesthesia was administered. Her abdomen was  prepped and draped with a ChloraPrep-containing solution. She received  preoperative Ancef. A time-out was taken for appropriate verification. An incision was made over this mass down through the dermis. Instead of  a lipoma, a large epidermal inclusion cyst was encountered. The dimensions of the cyst were 5 x 5 cm. I was able  to excise this completely. The cyst wall was removed completely. This  was done with a combination of cautery and sharp dissection. It did not  extend to the abdominal wall, but only in the subcutaneous tissues.   The  specimen was removed and sent to Pathology in formalin for permanent  section. Excellent hemostasis was then achieved and assured and irrigation was  performed. 0.25% Marcaine was injected for local analgesia. Ronald's  fascia was closed using three interrupted 3-0 Vicryl suture. The skin  incision was closed with 4-0 Monocryl in a subcuticular fashion. Steri-Strips, Mastisol and dressings were applied. Prior to and after closure, the lap, needle, and instrument counts were  all correct. The patient was taken from the operating room to the recovery room for  postop monitoring.         Melba Olivas MD    D: 08/12/2020 8:29:27       T: 08/12/2020 8:36:04     [email or web address]  Job#: [id number]     Doc#: [id number]    CC:

## 2020-11-03 ENCOUNTER — HOSPITAL ENCOUNTER (EMERGENCY)
Age: 30
Discharge: HOME | End: 2020-11-03
Payer: MEDICAID

## 2020-11-03 VITALS
DIASTOLIC BLOOD PRESSURE: 77 MMHG | SYSTOLIC BLOOD PRESSURE: 118 MMHG | WEIGHT: 140 LBS | BODY MASS INDEX: 22.5 KG/M2 | OXYGEN SATURATION: 98 % | HEIGHT: 66 IN | HEART RATE: 100 BPM | TEMPERATURE: 98.1 F | RESPIRATION RATE: 18 BRPM

## 2020-11-03 LAB
INFLUENZA A BY PCR: NEGATIVE
INFLUENZA B BY PCR: NEGATIVE
STREP GRP A PCR: POSITIVE

## 2020-11-03 PROCEDURE — 87502 INFLUENZA DNA AMP PROBE: CPT

## 2020-11-03 PROCEDURE — 99283 EMERGENCY DEPT VISIT LOW MDM: CPT

## 2020-11-03 PROCEDURE — 87651 STREP A DNA AMP PROBE: CPT

## 2020-11-03 RX ORDER — IBUPROFEN 800 MG/1
800 TABLET ORAL EVERY 8 HOURS PRN
Qty: 10 TABLET | Refills: 0 | Status: SHIPPED | OUTPATIENT
Start: 2020-11-03 | End: 2022-03-10

## 2020-11-03 RX ORDER — AMOXICILLIN 500 MG/1
1000 CAPSULE ORAL 2 TIMES DAILY
Qty: 40 CAPSULE | Refills: 0 | Status: SHIPPED | OUTPATIENT
Start: 2020-11-03 | End: 2020-11-13

## 2020-11-03 ASSESSMENT — PAIN DESCRIPTION - LOCATION: LOCATION: EAR;THROAT;GENERALIZED

## 2020-11-03 ASSESSMENT — ENCOUNTER SYMPTOMS
EYES NEGATIVE: 1
RESPIRATORY NEGATIVE: 1
SORE THROAT: 1
GASTROINTESTINAL NEGATIVE: 1

## 2020-11-03 ASSESSMENT — PAIN SCALES - GENERAL: PAINLEVEL_OUTOF10: 10

## 2020-11-03 NOTE — ED TRIAGE NOTES
Pt to ER with c/o left ear pain , sore throat and generalized body aches, pain 10/10, pt a&ox4, resp even and unlabored, pt states she had fever last night, pt afebrile in triage

## 2020-11-03 NOTE — ED PROVIDER NOTES
[facility] ED  eMERGENCY dEPARTMENT eNCOUnter      Pt Name: Dasha Villavicencio  MRN: [id number]  [place] 1990  Date of evaluation: 11/3/2020  Provider: Gail Kirkland PA-C      HISTORY OF PRESENT ILLNESS    Dasha Villavicencio is a 35 y.o. female who presents to the Emergency Department with chief complaint of sore throat, chills, and body aches. Patient states symptoms started yesterday. Patient had fever last night. Patient does not currently have a fever today. Patient denies any known ill contacts. Patient has not taken Tylenol or Motrin prior to arrival and is currently afebrile. She has no other concerns at this time. REVIEW OF SYSTEMS       Review of Systems   Constitutional: Positive for chills and fever. HENT: Positive for sore throat. Eyes: Negative. Respiratory: Negative. Cardiovascular: Negative. Gastrointestinal: Negative. Endocrine: Negative. Genitourinary: Negative. Musculoskeletal: Negative. Skin: Negative. Neurological: Negative. Psychiatric/Behavioral: Negative. PAST MEDICAL HISTORY     Past Medical History:   Diagnosis Date    Bladder infection     Chronic back pain     Hypertension     Scoliosis          SURGICAL HISTORY       Past Surgical History:   Procedure Laterality Date    SKIN BIOPSY N/A 8/12/2020    EXCISION ABDOMINAL WALL CYST performed by Chiquis Rutledge MD at [facility]       Previous Medications    LOSARTAN (COZAAR) 50 MG TABLET    Take 50 mg by mouth daily    SERTRALINE (ZOLOFT) 50 MG TABLET    Take 50 mg by mouth daily       ALLERGIES     Patient has no known allergies.     FAMILY HISTORY       Family History   Problem Relation Age of Onset    Other Mother     Diabetes Father           SOCIAL HISTORY       Social History     Socioeconomic History    Marital status: Single     Spouse name: None    Number of children: None    Years of education: None    Highest education level: None   Occupational History    None   Social Needs    Financial resource strain: None    Food insecurity     Worry: None     Inability: None    Transportation needs     Medical: None     Non-medical: None   Tobacco Use    Smoking status: Current Every Day Smoker     Packs/day: 0.50     Types: Cigarettes    Smokeless tobacco: Never Used    Tobacco comment: socially   Substance and Sexual Activity    Alcohol use: No     Alcohol/week: 0.0 standard drinks    Drug use: Not Currently     Types: Marijuana, Cocaine     Comment: 45 days sober    Sexual activity: Yes   Lifestyle    Physical activity     Days per week: None     Minutes per session: None    Stress: None   Relationships    Social connections     Talks on phone: None     Gets together: None     Attends Pentecostal service: None     Active member of club or organization: None     Attends meetings of clubs or organizations: None     Relationship status: None    Intimate partner violence     Fear of current or ex partner: None     Emotionally abused: None     Physically abused: None     Forced sexual activity: None   Other Topics Concern    None   Social History Narrative    None       SCREENINGS      @FLOW([id number])@      PHYSICAL EXAM    (up to 7 for level 4, 8 or more for level 5)     ED Triage Vitals [11/03/20 1457]   BP Temp Temp Source Pulse Resp SpO2 Height Weight   118/77 98.1 °F (36.7 °C) Temporal 100 18 98 % 5' 6\" (1.676 m) 140 lb (63.5 kg)       Physical Exam  Constitutional:       General: She is not in acute distress. Appearance: She is well-developed. HENT:      Head: Normocephalic and atraumatic. Mouth/Throat:      Pharynx: Posterior oropharyngeal erythema present. Eyes:      Conjunctiva/sclera: Conjunctivae normal.      Pupils: Pupils are equal, round, and reactive to light. Neck:      Musculoskeletal: Normal range of motion and neck supple. Cardiovascular:      Rate and Rhythm: Normal rate and regular rhythm. Heart sounds: No murmur. Pulmonary:      Effort: No respiratory distress. Breath sounds: Normal breath sounds. No wheezing or rales. Abdominal:      General: There is no distension. Palpations: Abdomen is soft. Tenderness: There is no abdominal tenderness. Musculoskeletal: Normal range of motion. Lymphadenopathy:      Cervical: Cervical adenopathy present. Right cervical: Posterior cervical adenopathy present. Left cervical: Posterior cervical adenopathy present. Skin:     General: Skin is warm and dry. Findings: No erythema or rash. Neurological:      Mental Status: She is alert and oriented to person, place, and time. Cranial Nerves: No cranial nerve deficit. Psychiatric:         Judgment: Judgment normal.           All other labs were within normal range or not returned as of this dictation. EMERGENCY DEPARTMENT COURSE and DIFFERENTIALDIAGNOSIS/MDM:   Vitals:    Vitals:    11/03/20 1457   BP: 118/77   Pulse: 100   Resp: 18   Temp: 98.1 °F (36.7 °C)   TempSrc: Temporal   SpO2: 98%   Weight: 140 lb (63.5 kg)   Height: 5' 6\" (1.676 m)          Patient afebrile without medications while in the emergency room. Rapid strep test is positive. Rapid influenza test is negative. Patient to start amoxicillin and Motrin. Follow-up with primary care provider for re-evaluation and treatment. Return here if symptoms worsen or if new concerning symptoms arise. Patient verbalizes understanding of plan discharge is no further questions. PROCEDURES:  Unless otherwise noted below, none     Procedures      FINAL IMPRESSION      1.  Streptococcal sore throat          DISPOSITION/PLAN   DISPOSITION            Lisa Guerrero PA-C (electronically signed)  Attending Emergency Physician  [facility]  11/03/20 6806

## 2022-03-09 ENCOUNTER — HOSPITAL ENCOUNTER (EMERGENCY)
Age: 32
Discharge: OTHER INSTITUTION | End: 2022-03-10
Payer: MEDICAID

## 2022-03-09 DIAGNOSIS — F19.94 SUBSTANCE INDUCED MOOD DISORDER (HCC): Primary | ICD-10-CM

## 2022-03-09 LAB
ACETAMINOPHEN LEVEL: <5 UG/ML (ref 10–30)
ALBUMIN SERPL-MCNC: 3.9 G/DL (ref 3.5–4.6)
ALP BLD-CCNC: 97 U/L (ref 40–130)
ALT SERPL-CCNC: 93 U/L (ref 0–33)
ANION GAP SERPL CALCULATED.3IONS-SCNC: 9 MEQ/L (ref 9–15)
AST SERPL-CCNC: 57 U/L (ref 0–35)
BASOPHILS ABSOLUTE: 0 K/UL (ref 0–0.2)
BASOPHILS RELATIVE PERCENT: 1 %
BILIRUB SERPL-MCNC: 0.3 MG/DL (ref 0.2–0.7)
BUN BLDV-MCNC: 8 MG/DL (ref 6–20)
CALCIUM SERPL-MCNC: 9 MG/DL (ref 8.5–9.9)
CHLORIDE BLD-SCNC: 102 MEQ/L (ref 95–107)
CHOLESTEROL, TOTAL: 124 MG/DL (ref 0–199)
CO2: 27 MEQ/L (ref 20–31)
CREAT SERPL-MCNC: 0.72 MG/DL (ref 0.5–0.9)
EOSINOPHILS ABSOLUTE: 0.3 K/UL (ref 0–0.7)
EOSINOPHILS RELATIVE PERCENT: 6.7 %
ETHANOL PERCENT: NORMAL G/DL
ETHANOL: <10 MG/DL (ref 0–0.08)
GFR AFRICAN AMERICAN: >60
GFR NON-AFRICAN AMERICAN: >60
GLOBULIN: 3.2 G/DL (ref 2.3–3.5)
GLUCOSE BLD-MCNC: 92 MG/DL (ref 70–99)
HCT VFR BLD CALC: 33.5 % (ref 37–47)
HDLC SERPL-MCNC: 48 MG/DL (ref 40–59)
HEMOGLOBIN: 11 G/DL (ref 12–16)
LDL CHOLESTEROL CALCULATED: 63 MG/DL (ref 0–129)
LYMPHOCYTES ABSOLUTE: 1.8 K/UL (ref 1–4.8)
LYMPHOCYTES RELATIVE PERCENT: 48.1 %
MCH RBC QN AUTO: 27.3 PG (ref 27–31.3)
MCHC RBC AUTO-ENTMCNC: 32.8 % (ref 33–37)
MCV RBC AUTO: 83.3 FL (ref 82–100)
MONOCYTES ABSOLUTE: 0.6 K/UL (ref 0.2–0.8)
MONOCYTES RELATIVE PERCENT: 16.6 %
NEUTROPHILS ABSOLUTE: 1 K/UL (ref 1.4–6.5)
NEUTROPHILS RELATIVE PERCENT: 27.6 %
PDW BLD-RTO: 15.7 % (ref 11.5–14.5)
PLATELET # BLD: 265 K/UL (ref 130–400)
POTASSIUM SERPL-SCNC: 4.1 MEQ/L (ref 3.4–4.9)
RBC # BLD: 4.02 M/UL (ref 4.2–5.4)
SALICYLATE, SERUM: <0.3 MG/DL (ref 15–30)
SARS-COV-2, NAAT: NOT DETECTED
SODIUM BLD-SCNC: 138 MEQ/L (ref 135–144)
TOTAL CK: 65 U/L (ref 0–170)
TOTAL PROTEIN: 7.1 G/DL (ref 6.3–8)
TRIGL SERPL-MCNC: 64 MG/DL (ref 0–150)
TSH SERPL DL<=0.05 MIU/L-ACNC: 0.63 UIU/ML (ref 0.44–3.86)
WBC # BLD: 3.8 K/UL (ref 4.8–10.8)

## 2022-03-09 PROCEDURE — 36415 COLL VENOUS BLD VENIPUNCTURE: CPT

## 2022-03-09 PROCEDURE — 96372 THER/PROPH/DIAG INJ SC/IM: CPT

## 2022-03-09 PROCEDURE — 80053 COMPREHEN METABOLIC PANEL: CPT

## 2022-03-09 PROCEDURE — 87635 SARS-COV-2 COVID-19 AMP PRB: CPT

## 2022-03-09 PROCEDURE — 80179 DRUG ASSAY SALICYLATE: CPT

## 2022-03-09 PROCEDURE — 2580000003 HC RX 258

## 2022-03-09 PROCEDURE — 6360000002 HC RX W HCPCS

## 2022-03-09 PROCEDURE — 82550 ASSAY OF CK (CPK): CPT

## 2022-03-09 PROCEDURE — 80061 LIPID PANEL: CPT

## 2022-03-09 PROCEDURE — 84703 CHORIONIC GONADOTROPIN ASSAY: CPT

## 2022-03-09 PROCEDURE — 6370000000 HC RX 637 (ALT 250 FOR IP): Performed by: PHYSICIAN ASSISTANT

## 2022-03-09 PROCEDURE — 81001 URINALYSIS AUTO W/SCOPE: CPT

## 2022-03-09 PROCEDURE — 80143 DRUG ASSAY ACETAMINOPHEN: CPT

## 2022-03-09 PROCEDURE — 84443 ASSAY THYROID STIM HORMONE: CPT

## 2022-03-09 PROCEDURE — 99285 EMERGENCY DEPT VISIT HI MDM: CPT

## 2022-03-09 PROCEDURE — 82077 ASSAY SPEC XCP UR&BREATH IA: CPT

## 2022-03-09 PROCEDURE — 87086 URINE CULTURE/COLONY COUNT: CPT

## 2022-03-09 PROCEDURE — 85025 COMPLETE CBC W/AUTO DIFF WBC: CPT

## 2022-03-09 PROCEDURE — 80307 DRUG TEST PRSMV CHEM ANLYZR: CPT

## 2022-03-09 RX ORDER — ZIPRASIDONE MESYLATE 20 MG/ML
INJECTION, POWDER, LYOPHILIZED, FOR SOLUTION INTRAMUSCULAR
Status: COMPLETED
Start: 2022-03-09 | End: 2022-03-09

## 2022-03-09 RX ORDER — LORAZEPAM 2 MG/ML
2 INJECTION INTRAMUSCULAR ONCE
Status: COMPLETED | OUTPATIENT
Start: 2022-03-09 | End: 2022-03-09

## 2022-03-09 RX ORDER — ZIPRASIDONE MESYLATE 20 MG/ML
20 INJECTION, POWDER, LYOPHILIZED, FOR SOLUTION INTRAMUSCULAR ONCE
Status: COMPLETED | OUTPATIENT
Start: 2022-03-09 | End: 2022-03-09

## 2022-03-09 RX ORDER — DIPHENHYDRAMINE HYDROCHLORIDE 50 MG/ML
50 INJECTION INTRAMUSCULAR; INTRAVENOUS ONCE
Status: COMPLETED | OUTPATIENT
Start: 2022-03-09 | End: 2022-03-09

## 2022-03-09 RX ORDER — DIPHENHYDRAMINE HYDROCHLORIDE 50 MG/ML
INJECTION INTRAMUSCULAR; INTRAVENOUS
Status: COMPLETED
Start: 2022-03-09 | End: 2022-03-09

## 2022-03-09 RX ORDER — LORAZEPAM 1 MG/1
1 TABLET ORAL ONCE
Status: COMPLETED | OUTPATIENT
Start: 2022-03-09 | End: 2022-03-09

## 2022-03-09 RX ORDER — LORAZEPAM 2 MG/ML
INJECTION INTRAMUSCULAR
Status: COMPLETED
Start: 2022-03-09 | End: 2022-03-09

## 2022-03-09 RX ADMIN — DIPHENHYDRAMINE HYDROCHLORIDE 50 MG: 50 INJECTION INTRAMUSCULAR; INTRAVENOUS at 21:22

## 2022-03-09 RX ADMIN — LORAZEPAM 1 MG: 1 TABLET ORAL at 17:54

## 2022-03-09 RX ADMIN — ZIPRASIDONE MESYLATE 20 MG: 20 INJECTION, POWDER, LYOPHILIZED, FOR SOLUTION INTRAMUSCULAR at 21:22

## 2022-03-09 RX ADMIN — LORAZEPAM 2 MG: 2 INJECTION INTRAMUSCULAR; INTRAVENOUS at 21:22

## 2022-03-09 RX ADMIN — WATER 1.1 ML: 1 INJECTION INTRAMUSCULAR; INTRAVENOUS; SUBCUTANEOUS at 21:22

## 2022-03-09 RX ADMIN — DIPHENHYDRAMINE HYDROCHLORIDE 50 MG: 50 INJECTION, SOLUTION INTRAMUSCULAR; INTRAVENOUS at 21:22

## 2022-03-09 RX ADMIN — LORAZEPAM 2 MG: 2 INJECTION INTRAMUSCULAR at 21:22

## 2022-03-09 ASSESSMENT — ENCOUNTER SYMPTOMS
SHORTNESS OF BREATH: 0
EYE PAIN: 0
ABDOMINAL PAIN: 0
ALLERGIC/IMMUNOLOGIC NEGATIVE: 1
APNEA: 0
COLOR CHANGE: 0
TROUBLE SWALLOWING: 0

## 2022-03-09 NOTE — ED PROVIDER NOTES
[facility] [facility] ED  EMERGENCY DEPARTMENT ENCOUNTER      Pt Name: Duane Grass  MRN: [id number]  [place] 1990  Date of evaluation: 3/9/2022  Provider: Emma Fernandez MD    CHIEF COMPLAINT       Chief Complaint   Patient presents with    Addiction Problem     heroin, meth, and fentanyl         HISTORY OF PRESENT ILLNESS   (Location/Symptom, Timing/Onset, Context/Setting, Quality, Duration, Modifying Factors, Severity)  Note limiting factors. Duane Grass is a 33 y.o. female who presents to the emergency department with addiction issues, primarily heroin, methamphetamine and fentanyl. Patient states that she was supposed to go into detox today and in route to the facility they had given away her bed and this upset her. Patient stated that she became suicidal and was brought to the emergency department. Patient states that she only states that she was suicidal because she was told that it would get her in faster and get help faster but patient primarily states she is here due to her addiction issues. Patient denies any recent illnesses or injuries     HPI    Nursing Notes were reviewed. REVIEW OF SYSTEMS    (2-9 systems for level 4, 10 or more for level 5)     Review of Systems   Constitutional: Negative for diaphoresis and fever. HENT: Negative for hearing loss and trouble swallowing. Eyes: Negative for pain. Respiratory: Negative for apnea and shortness of breath. Cardiovascular: Negative for chest pain. Gastrointestinal: Negative for abdominal pain. Endocrine: Negative. Genitourinary: Negative for hematuria. Musculoskeletal: Negative for neck pain and neck stiffness. Skin: Negative for color change. Allergic/Immunologic: Negative. Neurological: Negative for dizziness and numbness. Hematological: Negative. Psychiatric/Behavioral: The patient is nervous/anxious. All other systems reviewed and are negative.       Except as noted above the remainder of the review of systems was reviewed and negative. PAST MEDICAL HISTORY     Past Medical History:   Diagnosis Date    Bladder infection     Chronic back pain     Hypertension     Scoliosis          SURGICAL HISTORY       Past Surgical History:   Procedure Laterality Date    SKIN BIOPSY N/A 8/12/2020    EXCISION ABDOMINAL WALL CYST performed by Jose Silva MD at [address] [place] [address]       Previous Medications    No medications on file       ALLERGIES     Patient has no known allergies. FAMILY HISTORY       Family History   Problem Relation Age of Onset    Other Mother     Diabetes Father           SOCIAL HISTORY       Social History     Socioeconomic History    Marital status: Single     Spouse name: Not on file    Number of children: Not on file    Years of education: Not on file    Highest education level: Not on file   Occupational History    Not on file   Tobacco Use    Smoking status: Current Every Day Smoker     Packs/day: 0.50     Types: Cigarettes    Smokeless tobacco: Never Used    Tobacco comment: socially   Substance and Sexual Activity    Alcohol use: No     Alcohol/week: 0.0 standard drinks    Drug use: Not Currently     Types: Marijuana (Blenda Benítez), Cocaine     Comment: 45 days sober    Sexual activity: Yes   Other Topics Concern    Not on file   Social History Narrative    Not on file     Social Determinants of Health     Financial Resource Strain:     Difficulty of Paying Living Expenses: Not on file   Food Insecurity:     Worried About Running Out of Food in the Last Year: Not on file    Rebecca of Food in the Last Year: Not on file   Transportation Needs:     Lack of Transportation (Medical): Not on file    Lack of Transportation (Non-Medical):  Not on file   Physical Activity:     Days of Exercise per Week: Not on file    Minutes of Exercise per Session: Not on file   Stress:     Feeling of Stress : Not on file   Social Connections:     Frequency of Communication with Friends and Family: Not on file    Frequency of Social Gatherings with Friends and Family: Not on file    Attends Moravian Services: Not on file    Active Member of Clubs or Organizations: Not on file    Attends Club or Organization Meetings: Not on file    Marital Status: Not on file   Intimate Partner Violence:     Fear of Current or Ex-Partner: Not on file    Emotionally Abused: Not on file    Physically Abused: Not on file    Sexually Abused: Not on file   Housing Stability:     Unable to Pay for Housing in the Last Year: Not on file    Number of [place] in the Last Year: Not on file    Unstable Housing in the Last Year: Not on file       SCREENINGS        [place] Coma Scale  Eye Opening: Spontaneous  Best Verbal Response: Oriented  Best Motor Response: Obeys commands  [place] Coma Scale Score: 15      Clinical Opiate Withdrawal Scale  Resting Pulse rate: 80 beats/min or below  GI upset over last 30 mins: No GI symptoms  Sweating over last 30 mins: No report of chills or flushing  Tremor observed in outreached hands: No tremor  Restlessness observed during assessment: Reports difficulty sitting still, but is able to do so  Yawning observed during assessment: No yawning  Pupil size: Pinned or normal size for room light  Anxiety or Irritability: Patient reports increasing anxiousness or irritability  Bone or joint aches: Not present  Goose[place]lesh skin: Skin is smooth  Running Nose or tearing: Not present  Clinical Opiate Withdrawal Scale Score: 2        PHYSICAL EXAM    (up to 7 for level 4, 8 or more for level 5)     ED Triage Vitals   BP Temp Temp src Pulse Resp SpO2 Height Weight   -- -- -- -- -- -- -- --       Physical Exam  Vitals and nursing note reviewed. Constitutional:       General: She is not in acute distress. Appearance: She is well-developed. She is not diaphoretic. HENT:      Head: Normocephalic and atraumatic.       Mouth/Throat:      Pharynx: No oropharyngeal exudate. Eyes:      General: No scleral icterus. Conjunctiva/sclera: Conjunctivae normal.      Pupils: Pupils are equal, round, and reactive to light. Neck:      Trachea: No tracheal deviation. Cardiovascular:      Rate and Rhythm: Normal rate. Heart sounds: Normal heart sounds. Pulmonary:      Effort: Pulmonary effort is normal. No respiratory distress. Breath sounds: Normal breath sounds. Abdominal:      General: Bowel sounds are normal. There is no distension. Palpations: Abdomen is soft. Musculoskeletal:         General: Normal range of motion. Cervical back: Normal range of motion and neck supple. Skin:     General: Skin is warm and dry. Findings: No erythema or rash. Neurological:      Mental Status: She is alert and oriented to person, place, and time. Cranial Nerves: No cranial nerve deficit. Motor: No abnormal muscle tone. Psychiatric:         Mood and Affect: Mood is anxious and depressed. Affect is tearful. Behavior: Behavior is hyperactive. Thought Content: Thought content is not paranoid or delusional.         Judgment: Judgment is impulsive.          DIAGNOSTIC RESULTS     EKG: All EKG's are interpreted by the Emergency Department Physician who either signs or Co-signs this chart in the absence of a cardiologist.    Normal sinus rhythm, rate 76 bpm, no acute ST elevation or ischemic changes    RADIOLOGY:   Non-plain film images such as CT, Ultrasound and MRI are read by the radiologist. Plain radiographic images are visualized and preliminarily interpreted by the emergency physician with the below findings:    Interpretation per the Radiologist below, if available at the time of this note:    No orders to display         ED BEDSIDE ULTRASOUND:   Performed by ED Physician - none    LABS:  Labs Reviewed   ACETAMINOPHEN LEVEL - Abnormal; Notable for the following components:       Result Value    Acetaminophen Level <5 resources, safety plan. [facility]      REASSESSMENT          CRITICAL CARE TIME   Total Critical Care time was 0 minutes, excluding separately reportable procedures. There was a high probability of clinically significant/life threatening deterioration in the patient's condition which required my urgent intervention. CONSULTS:  None    PROCEDURES:  Unless otherwise noted below, none     Procedures      FINAL IMPRESSION      1. Substance induced mood disorder [facility])          DISPOSITION/PLAN   DISPOSITION Decision To Discharge 03/10/2022 01:07:42 PM      PATIENT REFERRED TO:  No follow-up provider specified. DISCHARGE MEDICATIONS:  New Prescriptions    No medications on file     Controlled Substances Monitoring:     RX Monitoring 10/4/2016   Attestation The Prescription Monitoring Report for this patient was reviewed today. Periodic Controlled Substance Monitoring No signs of potential drug abuse or diversion identified.        (Please note that portions of this note were completed with a voice recognition program.  Efforts were made to edit the dictations but occasionally words are mis-transcribed.)    Cintia Mast MD (electronically signed)  Attending Emergency Physician            Cintia Mast MD  03/10/22 0086

## 2022-03-09 NOTE — Clinical Note
Patient is being D/C from the ER and Nel Elizabeth from [facility] is [facility]
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
## 2022-03-09 NOTE — ED NOTES
Changed into [facility] clothing. Denies need to void. PO fluids encouraged. Oriented to [place]. Verbalizes understanding.       Ravin Guzman RN  03/09/22 5714

## 2022-03-10 VITALS
BODY MASS INDEX: 20.89 KG/M2 | HEART RATE: 73 BPM | SYSTOLIC BLOOD PRESSURE: 113 MMHG | TEMPERATURE: 97.9 F | DIASTOLIC BLOOD PRESSURE: 69 MMHG | OXYGEN SATURATION: 98 % | RESPIRATION RATE: 18 BRPM | HEIGHT: 66 IN | WEIGHT: 130 LBS

## 2022-03-10 LAB
AMPHETAMINE SCREEN, URINE: POSITIVE
BACTERIA: ABNORMAL /HPF
BARBITURATE SCREEN URINE: ABNORMAL
BENZODIAZEPINE SCREEN, URINE: ABNORMAL
BILIRUBIN URINE: NEGATIVE
BLOOD, URINE: NEGATIVE
CANNABINOID SCREEN URINE: ABNORMAL
CLARITY: ABNORMAL
COCAINE METABOLITE SCREEN URINE: POSITIVE
COLOR: YELLOW
EPITHELIAL CELLS, UA: ABNORMAL /HPF (ref 0–5)
GLUCOSE URINE: NEGATIVE MG/DL
HCG(URINE) PREGNANCY TEST: NEGATIVE
HYALINE CASTS: ABNORMAL /HPF (ref 0–5)
KETONES, URINE: NEGATIVE MG/DL
LEUKOCYTE ESTERASE, URINE: ABNORMAL
Lab: ABNORMAL
METHADONE SCREEN, URINE: ABNORMAL
NITRITE, URINE: NEGATIVE
OPIATE SCREEN URINE: ABNORMAL
OXYCODONE URINE: ABNORMAL
PH UA: 5.5 (ref 5–9)
PHENCYCLIDINE SCREEN URINE: ABNORMAL
PROPOXYPHENE SCREEN: ABNORMAL
PROTEIN UA: NEGATIVE MG/DL
RBC UA: ABNORMAL /HPF (ref 0–2)
SPECIFIC GRAVITY UA: 1.01 (ref 1–1.03)
URINE REFLEX TO CULTURE: YES
UROBILINOGEN, URINE: 1 E.U./DL
WBC UA: ABNORMAL /HPF (ref 0–5)

## 2022-03-10 PROCEDURE — 6370000000 HC RX 637 (ALT 250 FOR IP): Performed by: PERSONAL EMERGENCY RESPONSE ATTENDANT

## 2022-03-10 RX ORDER — LORAZEPAM 1 MG/1
2 TABLET ORAL ONCE
Status: COMPLETED | OUTPATIENT
Start: 2022-03-10 | End: 2022-03-10

## 2022-03-10 RX ADMIN — LORAZEPAM 2 MG: 1 TABLET ORAL at 14:44

## 2022-03-10 NOTE — ED NOTES
Pt was escorted to the parking lot with all of her belongings via a W/C with [employer] staff.      Marlene Castaneda RN  03/10/22 2116

## 2022-03-10 NOTE — ED NOTES
Lunch was given to the pt at the bedside, she is quiet and cooperative with no problems and no C/O any kind expressed.      Zena Cha RN  03/10/22 8118

## 2022-03-10 NOTE — ED NOTES
Patient is currently not taking any medications. Call placed to [facility] and no prescriptions filled since Narcan in November 2021.       Monisha Jones RN  03/10/22 7300

## 2022-03-10 NOTE — ED NOTES
Patient appears to be  sleeping respirations are even and unlabored no distress noted at this time.      Kristina Lazcano, RN  03/10/22 4411

## 2022-03-10 NOTE — ED NOTES
Pt ate well for lunch drinking fluids with no problems and no C/O any kind expressed     Kishore Dumont, MIGUEL  03/10/22 4413

## 2022-03-10 NOTE — ED NOTES
Wendi Gordon from [facility] was here and was asking about the pt if she was being admitted and could she F/U with her where she was going to be at for her admit. Marcella Gainesws that pt was being D/C home. Wendi Gordon asked if she could talk with her about placement for her if she was going to be D/C. Wendi Gordon talked with the pt and pt is willing to wait for someone to talk with her regarding placement. Wendi Gordon has to see someone else in the hospital for placement and will return or another staff from [facility] will be back to see the pt for placement.        Shanel Hatfield RN  03/10/22 6692

## 2022-03-10 NOTE — ED NOTES
Pt is in bed area quiet and cooperative with no problems and no C/O any kind expressed.      Shanel Hatfield RN  03/10/22 8217

## 2022-03-10 NOTE — ED NOTES
Called placed with Letty from [facility]. Zak Wolf from [facility] will be here within an hour to speak with patient.          Yasir Samson RN  03/09/22 8988

## 2022-03-10 NOTE — ED NOTES
Pt went through her belongings in the office before she got dressed. Pt has all of her belongings listed on her inventory sheet.      Mariya Feliciano RN  03/10/22 6864

## 2022-03-10 NOTE — ED NOTES
Patients been declined at [facility].      Corey Turpin RN  03/10/22 6507

## 2022-03-10 NOTE — ED NOTES
Around 2110- Patient was speaking with [facility] regarding treatment for substance abuse. Patient stated to [facility] that she did not want residential treatment. LGR relayed this information to this RN. This RN spoke with the patient and explained that due to signing in for suicidal thoughts as well as substance treatment she must go to residential or stay for psychiatric treatment. Patient became very angry, yelling at this RN as well as Watson Brown. Patient stated \"I dont fucking want treatment anymore, when I get out of here I am just going to shoot up\". LGR was asked to leave by this RN when patient behavior escalated. This RN and Watson Brown attempted to deescalate patient behavior but was unsuccessful. This RN and Watson Brown told the patient that we would allow her to calm down and will follow up with the patient. Patient then approached the RN station. Patient refused to return back to her bed despite direction of Watson Brown. Patient stated to Watson Brown, \"I dont have to listen to you, I have to listen to her\" when referring to this RN. This RN instructed the patient to listen to the officer and finally returned to her assigned bed. Patient was uncooperative with staff despite redirection, behavior was out of control and inappropriate for unit. Loren Gordon RN called physician to explain patient behavior and was instructed to medicate patient with IM injections. Patient was notified of the pink slip as well as orders to medicate. Patient was given the option to choose site for injection and chose to have 2 injections in the R Deltoid and 1 injection in the L Deltoid. Patient was tearful during medication administration and complained that her arms hurt and that she could not move them. This RN assessed the site which was unaffected by medication administration. Patient was able to move both of her arms with no issue. Patient offered food and drink which was provided.  Patient also given heated blanket for comfort. 2145- Patient is now resting in bed. Patient is calm, cooperative and behavior is in control at this time. Patient drinking and eating food provided to her.      Bessy Carmona RN  03/09/22 8594

## 2022-03-10 NOTE — ED NOTES
COWS initiated due to patient stating she uses methamphetamine, heroin and fentanyl.       Thais Bell, MIGUEL  03/09/22 6642

## 2022-03-10 NOTE — ED NOTES
Patient currently denies SI, denies HI and denies A/V hallucinations. States she relapsed in November 2020 and has been using crack, methamphetamines and fentanyl daily. States she had previously went to detox in [facility]. She is currently homeless and has been staying with friends \"couch surfing. \" States there is no one to call for collateral. Denies need for admission. States \"I just don't want to go through withdrawal.\" Denies any self harm or suicide attempt before admission.       Rajinder Antunez RN  03/10/22 2154

## 2022-03-10 NOTE — ED NOTES
Positive affect of medications patient appears to be  sleeping respirations are even and unlabored no distress noted at this time.      Francisco Clemons RN  03/09/22 0341

## 2022-03-10 NOTE — ED NOTES
Lab called and the lab needs additional urine to complete their labs for the pt that were ordered.   Advised lab that would send the urine needed for her testing            Mariya Quiet, RN  03/10/22 1024

## 2022-03-10 NOTE — ED NOTES
Pt is quiet and cooperative with no problems and no C/O any kind expressed. Pt has even respirations no cough, no respiratory distress, and no SOB noted.      Shanel Hatfield RN  03/10/22 0032

## 2022-03-10 NOTE — ED NOTES
Patient reviewed with Dr. Libia Loza. Discussed events leading to admit, current assessment, labs, toxicology, LGR assessment and previous history. Received order to discharge patient due to not meeting criteria for inpatient psychiatric admission.       Enrique Pacheco RN  03/10/22 3246

## 2022-03-10 NOTE — ED NOTES
Report received from [facility]. Patient resting in bed. Patient is agitated and is requesting that she speak with LGR. Vital signs assessed and WNL.      Abhay Hdez RN  03/09/22 4441

## 2022-03-10 NOTE — ED NOTES
Pt is quiet in bed area with even respirations no problems and no C/O any kind expressed. Sent urine to the lab as requested.      Aminata Givens RN  03/10/22 6902

## 2022-03-10 NOTE — ED NOTES
Pt ate fair for breakfast drinking fluids with no problems and no C/O any kind expressed.      Christiane Goff RN  03/10/22 4318

## 2022-03-10 NOTE — ED NOTES
Patient up to bathroom. Reminded need for urine specimen to be provided. Patient verbalizes understanding and ambulates to bathroom with specimen cup.      Venu Enriquez RN  03/10/22 2026

## 2022-03-10 NOTE — ED NOTES
Elizabeth Vivar is here to pick the pt up for her admit to [facility]. Pt is getting dress from her belongings and will be taken to the parking lot for her D/C  Pt is signing her D/C paper work and getting dressed.      Jersey Swenson RN  03/10/22 1500

## 2022-03-10 NOTE — ED NOTES
Theodora Paris is taking the pt with her to [facility] to [facility] and they brought her belongings to the [employer]. Pt accepted the medication ordered for her Ativan 2 mg orally. Pt is aware and cooperative with her going to be admitted to [facility].   Her pink sheet was rescinded by the psychiatric and ER doctor for her D/C Safety plan was completed with the pt and orders for her D/C to [facility] staff, Eric Monteiro RN  03/10/22 3223

## 2022-03-10 NOTE — ED NOTES
Patient continues to be monitored after being medicated. No issues of any kind. Will continue to monitor closely.       Cary Mcmillan, MIGUEL  03/09/22 9740

## 2022-03-10 NOTE — ED NOTES
Pt is awake in bed area and cooperative with waiting for [facility] to help her with placement for rehab, detox placement     Mariya Feliciano RN  03/10/22 9331

## 2022-03-11 LAB — URINE CULTURE, ROUTINE: NORMAL

## 2022-03-15 LAB
EKG ATRIAL RATE: 76 BPM
EKG P AXIS: 77 DEGREES
EKG P-R INTERVAL: 136 MS
EKG Q-T INTERVAL: 400 MS
EKG QRS DURATION: 88 MS
EKG QTC CALCULATION (BAZETT): 450 MS
EKG R AXIS: 56 DEGREES
EKG T AXIS: 16 DEGREES
EKG VENTRICULAR RATE: 76 BPM

## 2023-02-27 NOTE — PROGRESS NOTES
Requested medication(s) are due for refill today: Yes  Patient has already received a courtesy refill: No  Other reason request has been forwarded to provider: Subjective:      Patient ID: Carole Tavarez is a 35 y.o. female who presents for:  Chief Complaint   Patient presents with    New Patient       This is a 18-year-old female who has a longstanding abdominal wall lipoma. She is here for discussion regarding excision. It occasionally bothers her when she bumps her abdominal wall. Denies any other issues such as nausea or vomiting. No previous abdominal surgery. Past Medical History:   Diagnosis Date    Bladder infection     Chronic back pain     Scoliosis      History reviewed. No pertinent surgical history.   Social History     Socioeconomic History    Marital status: Single     Spouse name: Not on file    Number of children: Not on file    Years of education: Not on file    Highest education level: Not on file   Occupational History    Not on file   Social Needs    Financial resource strain: Not on file    Food insecurity     Worry: Not on file     Inability: Not on file    Transportation needs     Medical: Not on file     Non-medical: Not on file   Tobacco Use    Smoking status: Former Smoker     Packs/day: 0.50     Types: Cigarettes    Smokeless tobacco: Never Used    Tobacco comment: socially   Substance and Sexual Activity    Alcohol use: No     Alcohol/week: 0.0 standard drinks    Drug use: No    Sexual activity: Yes   Lifestyle    Physical activity     Days per week: Not on file     Minutes per session: Not on file    Stress: Not on file   Relationships    Social connections     Talks on phone: Not on file     Gets together: Not on file     Attends [employer] service: Not on file     Active member of club or organization: Not on file     Attends meetings of clubs or organizations: Not on file     Relationship status: Not on file    Intimate partner violence     Fear of current or ex partner: Not on file     Emotionally abused: Not on file     Physically abused: Not on file     Forced sexual activity: Not on file   Other Topics Concern    Not on file   Social History Narrative    Not on file     Family History   Problem Relation Age of Onset    Other Mother     Diabetes Father      Allergies:  Patient has no known allergies. Review of Systems   Constitutional: Negative for activity change, fatigue, fever and unexpected weight change. HENT: Negative for congestion. Respiratory: Negative for chest tightness and shortness of breath. Cardiovascular: Negative for chest pain and palpitations. Gastrointestinal: Negative for abdominal pain, constipation, diarrhea and vomiting. Genitourinary: Negative for difficulty urinating. Musculoskeletal: Negative for arthralgias. Skin: Negative for color change. Neurological: Negative for dizziness and headaches. Hematological: Does not bruise/bleed easily. Psychiatric/Behavioral: Negative for agitation and confusion. Objective:    Pulse 70   Temp 96.9 °F (36.1 °C) (Temporal)   Ht 5' 5\" (1.651 m)   Wt 145 lb (65.8 kg)   SpO2 97%   BMI 24.13 kg/m²     Physical Exam  Constitutional:       Appearance: She is well-developed. HENT:      Head: Normocephalic and atraumatic. Eyes:      Pupils: Pupils are equal, round, and reactive to light. Neck:      Musculoskeletal: Normal range of motion and neck supple. Cardiovascular:      Rate and Rhythm: Normal rate and regular rhythm. Heart sounds: Normal heart sounds. Pulmonary:      Effort: Pulmonary effort is normal. No respiratory distress. Breath sounds: Normal breath sounds. No wheezing. Abdominal:      General: There is no distension. Palpations: There is no mass. Tenderness: There is no abdominal tenderness. There is no guarding or rebound. Comments: She has a 8 x 8 cm mobile lipoma left upper quadrant below the costal margin. No evidence of hernia. No skin changes present. Musculoskeletal: Normal range of motion. Skin:     General: Skin is warm and dry.       Coloration: Skin is not pale. Findings: No erythema or rash. Neurological:      Mental Status: She is alert and oriented to person, place, and time. Psychiatric:         Behavior: Behavior normal.         Judgment: Judgment normal.              Assessment/Plan:          Diagnosis Orders   1. Lipoma of abdominal wall       I discussed the risks and benefits of excising this large lipoma under sedation. Risks of procedure including infection, bleeding, and postoperative pain addressed. I reviewed her chart and noted that she has had issues with prescription medications in the past.  I have discussed that she will have a postoperative prescription and she has told me that she will let her father guarded so that is not abused. She wishes to proceed with excision of this large lipoma. Consent obtained. The patient was counseled at length about the risks of bhumika Covid-19 during their perioperative period and any recovery window from their procedure. The patient was made aware that bhumika Covid-19  may worsen their prognosis for recovering from their procedure  and lend to a higher morbidity and/or mortality risk. All material risks, benefits, and reasonable alternatives including postponing the procedure were discussed. The patient does wish to proceed with the procedure at this time. Please note this report has beenpartially produced using speech recognition software and may cause contain errors related to that system including grammar, punctuation and spelling as well as words and phrases that may seem inappropriate.  If there arequestions or concerns please feel free to contact me to clarify

## 2023-03-29 ENCOUNTER — HOSPITAL ENCOUNTER (EMERGENCY)
Age: 33
Discharge: HOME | End: 2023-03-30
Payer: MEDICAID

## 2023-03-29 DIAGNOSIS — T40.601A OPIATE OVERDOSE, ACCIDENTAL OR UNINTENTIONAL, INITIAL ENCOUNTER (HCC): Primary | ICD-10-CM

## 2023-03-29 PROCEDURE — 99283 EMERGENCY DEPT VISIT LOW MDM: CPT

## 2023-03-29 ASSESSMENT — PAIN - FUNCTIONAL ASSESSMENT: PAIN_FUNCTIONAL_ASSESSMENT: NONE - DENIES PAIN

## 2023-03-30 VITALS
DIASTOLIC BLOOD PRESSURE: 76 MMHG | RESPIRATION RATE: 12 BRPM | OXYGEN SATURATION: 99 % | HEART RATE: 80 BPM | SYSTOLIC BLOOD PRESSURE: 119 MMHG

## 2023-03-30 RX ORDER — NALOXONE HYDROCHLORIDE 0.4 MG/ML
0.4 INJECTION, SOLUTION INTRAMUSCULAR; INTRAVENOUS; SUBCUTANEOUS ONCE
Status: DISCONTINUED | OUTPATIENT
Start: 2023-03-30 | End: 2023-03-30

## 2023-03-30 ASSESSMENT — ENCOUNTER SYMPTOMS
APNEA: 0
ANAL BLEEDING: 0
VOMITING: 0
COUGH: 0
ABDOMINAL DISTENTION: 0
VOICE CHANGE: 0
SHORTNESS OF BREATH: 0
NAUSEA: 0
EYE DISCHARGE: 0

## 2023-03-30 ASSESSMENT — PAIN - FUNCTIONAL ASSESSMENT: PAIN_FUNCTIONAL_ASSESSMENT: NONE - DENIES PAIN

## 2023-03-30 NOTE — ED TRIAGE NOTES
Pt to ED due to drug overdose. Pt admits to taking heroin. Pt is drowsy but is alert and oriented.  Resp are regular and equal.

## 2023-03-30 NOTE — ED NOTES
Patient alert and oriented x4 at this time. Patient ambulatory on discharge. Patient skin pink, warm, and dry. Patient respirations are even and unlabored at this time. Discharge instructions reviewed with patient. Patient verbalized understanding and states no questions at this time. Writer stressed the importance of getting Narcan, patient verbalized understanding and states she \"does not want Narcan\". Writer stressed to the patient the importance of patient stopping all substance use, patient verbalized understanding.      Manuela Baca RN  03/30/23 6190

## 2023-03-30 NOTE — ED NOTES
Rodger MIKE states patient okay to be discharged due to patient being alert and oriented and patient verbalizing understanding that not getting Narcan the patient is at risk for death caused by overdose. Patient verbalized understanding and states she is \"refusing the Narcan\".       Charlie Barger RN  03/30/23 5831

## 2023-03-30 NOTE — ED NOTES
Pt arguing with significant other. Pt's significant other taken out of the room and sent to waiting room.       Ry Rosales  03/30/23 0054

## 2023-03-30 NOTE — ED NOTES
Patient alert and oriented at this time. Patient verbalized understanding that she could possibly die from not taking Narcan. Patient refusing to have Narcan given as ordered.      Manuela Baac RN  03/30/23 1353

## 2023-03-30 NOTE — DISCHARGE INSTRUCTIONS
Discontinue all recreational substances follow-up with primary care physician return to if any symptoms worsen or new symptoms well. Follow-up with addiction services from [facility] as discussed.

## 2023-03-30 NOTE — ED PROVIDER NOTES
regular rhythm. Pulses: Normal pulses. Heart sounds: Normal heart sounds. Pulmonary:      Effort: Pulmonary effort is normal. No respiratory distress. Breath sounds: Normal breath sounds. No stridor. No wheezing or rales. Abdominal:      General: Bowel sounds are normal. There is no distension. Palpations: Abdomen is soft. Musculoskeletal:         General: Normal range of motion. Cervical back: Normal range of motion and neck supple. Skin:     General: Skin is warm. Findings: No erythema. Neurological:      Mental Status: She is alert and oriented to person, place, and time. Psychiatric:         Mood and Affect: Mood normal.       RESULTS     EKG: All EKG's are interpreted by the Emergency Department Physician who either signs or Co-signsthis chart in the absence of a cardiologist.         RADIOLOGY:   [place] such as CT, Ultrasound and MRI are read by the radiologist. Plain radiographic images are visualized and preliminarily interpreted by the emergency physician with the below findings:         Interpretation per the Radiologist below, if available at the time ofthis note:    No orders to display         ED BEDSIDE ULTRASOUND:   Performed by ED Physician - none    LABS:  Labs Reviewed - No data to display    All other labs were within normal range or not returned as of this dictation. EMERGENCY DEPARTMENT COURSE and DIFFERENTIAL DIAGNOSIS/MDM:   Vitals:    Vitals:    03/30/23 0000 03/30/23 0030 03/30/23 0040 03/30/23 0050   BP: 120/79 122/84     Pulse: 86 (!) 103 96 (!) 106   Resp: 13 16 14 18   SpO2: 94% 100% 100%             MDM  Number of Diagnoses or Management Options  Opiate overdose, accidental or unintentional, initial encounter [facility])  Diagnosis management comments: Somnolent wakes to voice. Denies any intent to harm herself or anyone else denies suicidal ideation states this was accidental overdose.   Monitor for 2 hours when patient stable may discharge home. Patient continues to have episodes of somnolence we will continue monitoring she does not want any addiction services at this time we will provide information for [facility] later will sign out to [facility] and Dr. Belia Abdi for continued care. Pt has been here for 2.5 hours and still drowsy in the room, falling asleep with pinpoint pupils. Arousable with verbal stimuli. Narcan 0.4mg IN attempted to be given but patient refusing. She was able to eat a sandwich and drowsiness improved and pupil size improved. We did get ahold of her mom but unable to come pick patient up. Does not want to speak to [facility] today. Standard anticipatory guidance given to patient upon discharge. Have given them a specific time frame in which to follow-up and who to follow-up with. I have also advised them that they should return to the emergency department if they get worse, or not getting better or develop any new or concerning symptoms. Patient demonstrates understanding. CONSULTS:  None    PROCEDURES:  Unless otherwise noted below, none     Procedures    FINAL IMPRESSION      1. Opiate overdose, accidental or unintentional, initial encounter [facility])          DISPOSITION/PLAN   DISPOSITION Ed Observation 03/30/2023 01:07:21 AM      PATIENT REFERRED TO:  [facility]  [address]  [phone]  Call in 1 day      [facility]  addiction services/ see attached pamphlet  Call in 1 day      [facility]) ED  [address] [address]  [phone]  Go to       DISCHARGE MEDICATIONS:  New Prescriptions    No medications on file          (Please note that portions of this note were completed with a voice recognition program.  Efforts were made to edit the dictations but occasionally words are mis-transcribed. )    RASHID Clinton (electronically signed)  Attending Emergency Physician        RASHID Valdez  03/30/23 6481

## 2023-04-07 ENCOUNTER — HOSPITAL ENCOUNTER (EMERGENCY)
Age: 33
Discharge: HOME | End: 2023-04-07
Attending: EMERGENCY MEDICINE
Payer: MEDICAID

## 2023-04-07 ENCOUNTER — APPOINTMENT (OUTPATIENT)
Dept: GENERAL RADIOLOGY | Age: 33
End: 2023-04-07
Payer: MEDICAID

## 2023-04-07 VITALS
DIASTOLIC BLOOD PRESSURE: 95 MMHG | HEART RATE: 81 BPM | WEIGHT: 130 LBS | HEIGHT: 64 IN | TEMPERATURE: 98.2 F | RESPIRATION RATE: 16 BRPM | OXYGEN SATURATION: 99 % | BODY MASS INDEX: 22.2 KG/M2 | SYSTOLIC BLOOD PRESSURE: 121 MMHG

## 2023-04-07 DIAGNOSIS — T40.1X1A ACCIDENTAL OVERDOSE OF HEROIN, INITIAL ENCOUNTER (HCC): Primary | ICD-10-CM

## 2023-04-07 LAB
HCG, URINE, POC: NEGATIVE
Lab: NORMAL
NEGATIVE QC PASS/FAIL: NORMAL
POSITIVE QC PASS/FAIL: NORMAL

## 2023-04-07 PROCEDURE — 71045 X-RAY EXAM CHEST 1 VIEW: CPT

## 2023-04-07 ASSESSMENT — ENCOUNTER SYMPTOMS
COUGH: 0
ABDOMINAL PAIN: 0
SHORTNESS OF BREATH: 0
SORE THROAT: 0
DIARRHEA: 0
NAUSEA: 0
BACK PAIN: 0
VOMITING: 0

## 2023-04-07 ASSESSMENT — LIFESTYLE VARIABLES
HOW OFTEN DO YOU HAVE A DRINK CONTAINING ALCOHOL: NEVER
HOW MANY STANDARD DRINKS CONTAINING ALCOHOL DO YOU HAVE ON A TYPICAL DAY: PATIENT DOES NOT DRINK

## 2023-04-07 ASSESSMENT — PAIN - FUNCTIONAL ASSESSMENT: PAIN_FUNCTIONAL_ASSESSMENT: NONE - DENIES PAIN

## 2023-04-07 NOTE — ED NOTES
Pt ambulates with a slow gait at this time  Pt offered by at this time help for drug abuse and pt declines  Pt states \"I forget things fast because of all the times I overdose\".       Kota Crespo RN  04/07/23 9365

## 2023-04-07 NOTE — ED NOTES
Pt states to xray that she may be pregnant   Pt to restroom to obtain a urine     Roxann Rojo RN  04/07/23 3841

## 2023-04-07 NOTE — ED NOTES
Pt given ice cream and fluids at this time   Pt given cereal at this time     Gumaro Sullivan, MIGUEL  04/07/23 4071

## 2023-04-07 NOTE — ED PROVIDER NOTES
Accidental overdose of heroin, initial encounter [facility])          [address] Discharge - Pending Orders Complete 04/07/2023 01:03:01 PM        DISCHARGE MEDICATIONS:  [unfilled]         Brenton Leal MD(electronically signed)  Attending Emergency Physician            Brenton Leal MD  04/07/23 125 Mary Anne Siegel MD  04/07/23 0043

## 2023-04-07 NOTE — ED NOTES
Pt updated on results and that she will be discharge and it is ok to get dressed     Alex Rosas RN  04/07/23 2689

## 2023-04-07 NOTE — ED NOTES
[facility] called due to pt  in restroom and refused to unlock   Pt talking at this time but does not open door  Door unlocked and pt on commode at this time   Pt states \"I was picking at my face\"  Pt states that she is going to urinate she wasn't able to \"but I know that have to I feel it\".    Pt removed shirt and bra and assisted with gown   Pt removed bra and had a silver foil in right hand at this time   Pt gave foil to this nurse who took and placed on paper towel, EDUARDOD made aware  Foil has black color residue in it  Pt denies any use while in restroom and states that she forgot that the foil was in bra from earlier in the day  Pt is alert and oriented at this time  Pt ambulated back to room   Pt able to urinate and pregnancy negative     Sarah Kruger, MIGUEL  04/07/23 510 [facility], RN  04/07/23 0551

## 2023-04-07 NOTE — ED NOTES
Pt given information at discharge for [facility] and shelters in the area. Pt previously states that she is homeless at times. Pt given clothing to leave in at this time. Pt continues to state that she does not want any help stopping using.       Wanda Jones RN  04/07/23 3267

## 2023-12-31 ENCOUNTER — HOSPITAL ENCOUNTER (EMERGENCY)
Age: 33
Discharge: LEFT BEFORE DISCHARGE | End: 2023-12-31
Payer: MEDICAID

## 2023-12-31 VITALS
SYSTOLIC BLOOD PRESSURE: 144 MMHG | DIASTOLIC BLOOD PRESSURE: 82 MMHG | TEMPERATURE: 98.4 F | HEART RATE: 93 BPM | BODY MASS INDEX: 22.31 KG/M2 | WEIGHT: 130 LBS | OXYGEN SATURATION: 100 % | RESPIRATION RATE: 16 BRPM

## 2023-12-31 DIAGNOSIS — N93.9 VAGINAL BLEEDING: Primary | ICD-10-CM

## 2023-12-31 DIAGNOSIS — Z53.21 ELOPED FROM EMERGENCY DEPARTMENT: ICD-10-CM

## 2023-12-31 PROCEDURE — 99281 EMR DPT VST MAYX REQ PHY/QHP: CPT

## 2023-12-31 ASSESSMENT — PAIN - FUNCTIONAL ASSESSMENT: PAIN_FUNCTIONAL_ASSESSMENT: NONE - DENIES PAIN

## 2024-01-01 NOTE — ED PROVIDER NOTES
Independent RAYMOND Visit.       [facility]  Department of Emergency Medicine   ED  Encounter Note  Admit Date/RoomTime: 2023  7:31 PM  ED Room: WAITING RESULTS/WAITING *    NAME: Jess Diaz  : 1990  MRN: [id number]     Chief Complaint:  Abdominal Pain (Lower   Pt is 4 months pregnant.   Pt stated it started today) and Vaginal Bleeding    History of Present Illness       Jess Diaz is a 33 y.o. old female who presents to the emergency department by private vehicle for abnormal bleeding, which occured a few hour(s) prior to arrival.  Since onset the symptoms have been resolved and mild in severity.  Symptoms are associated with no additional symptoms and denies any back pain, chest pain, shortness of breath, fever, chills, nausea, vomiting, diarrhea, constipation, dark/black stools, blood in stool, blood in emesis, cloudy urine, urinary frequency, or dysuria. She takes no blood thinning agents.  No LMP recorded.. .  Patient states that she has not had a period in 4 months.  She states that she has recently been clean off of fentanyl for the past 30 days.  She states that today she had some spotting when she wiped.  She states that she had another episode and she went to the bathroom and had a small amount of vaginal bleeding.  She states after the second episode she has had no further vaginal bleeding.  She states she only had cramping in the lower belly when she had bleeding.  She denies any abdominal pain upon exam, nausea vomiting, diarrhea, fever or chills.  Patient states that she is unsure if she is pregnant.  She states that she is at a sober living house and they wanted her sent in to be evaluated for possible miscarriage.  Patient is well-appearing, nontoxic and in no acute distress  ROS   Pertinent positives and negatives are stated within HPI, all other systems reviewed and are negative.    Past Medical History:  has a past medical history of Bladder infection,

## 2024-07-20 ENCOUNTER — APPOINTMENT (OUTPATIENT)
Dept: CARDIOLOGY | Facility: HOSPITAL | Age: 34
End: 2024-07-20

## 2024-07-20 ENCOUNTER — HOSPITAL ENCOUNTER (INPATIENT)
Facility: HOSPITAL | Age: 34
End: 2024-07-20
Attending: EMERGENCY MEDICINE | Admitting: STUDENT IN AN ORGANIZED HEALTH CARE EDUCATION/TRAINING PROGRAM

## 2024-07-20 DIAGNOSIS — R40.0 SOMNOLENCE: ICD-10-CM

## 2024-07-20 DIAGNOSIS — F19.10 SUBSTANCE ABUSE (MULTI): ICD-10-CM

## 2024-07-20 DIAGNOSIS — T50.901A ACCIDENTAL DRUG OVERDOSE, INITIAL ENCOUNTER: Primary | ICD-10-CM

## 2024-07-20 LAB
ALBUMIN SERPL BCP-MCNC: 3.8 G/DL (ref 3.4–5)
ALP SERPL-CCNC: 68 U/L (ref 33–110)
ALT SERPL W P-5'-P-CCNC: 41 U/L (ref 7–45)
AMPHETAMINES UR QL SCN: ABNORMAL
ANION GAP SERPL CALC-SCNC: <7 MMOL/L (ref 10–20)
APAP SERPL-MCNC: <10 UG/ML
AST SERPL W P-5'-P-CCNC: 36 U/L (ref 9–39)
ATRIAL RATE: 65 BPM
BARBITURATES UR QL SCN: ABNORMAL
BASOPHILS # BLD AUTO: 0.03 X10*3/UL (ref 0–0.1)
BASOPHILS NFR BLD AUTO: 0.5 %
BENZODIAZ UR QL SCN: ABNORMAL
BILIRUB SERPL-MCNC: 0.2 MG/DL (ref 0–1.2)
BUN SERPL-MCNC: 11 MG/DL (ref 6–23)
BZE UR QL SCN: ABNORMAL
CALCIUM SERPL-MCNC: 9 MG/DL (ref 8.6–10.3)
CANNABINOIDS UR QL SCN: ABNORMAL
CHLORIDE SERPL-SCNC: 103 MMOL/L (ref 98–107)
CO2 SERPL-SCNC: 32 MMOL/L (ref 21–32)
CREAT SERPL-MCNC: 0.65 MG/DL (ref 0.5–1.05)
EGFRCR SERPLBLD CKD-EPI 2021: >90 ML/MIN/1.73M*2
EOSINOPHIL # BLD AUTO: 0.22 X10*3/UL (ref 0–0.7)
EOSINOPHIL NFR BLD AUTO: 3.5 %
ERYTHROCYTE [DISTWIDTH] IN BLOOD BY AUTOMATED COUNT: 13.6 % (ref 11.5–14.5)
ETHANOL SERPL-MCNC: <10 MG/DL
FENTANYL+NORFENTANYL UR QL SCN: ABNORMAL
GLUCOSE SERPL-MCNC: 122 MG/DL (ref 74–99)
HCG UR QL IA.RAPID: NEGATIVE
HCT VFR BLD AUTO: 30.5 % (ref 36–46)
HGB BLD-MCNC: 9.7 G/DL (ref 12–16)
HOLD SPECIMEN: NORMAL
HOLD SPECIMEN: NORMAL
IMM GRANULOCYTES # BLD AUTO: 0.02 X10*3/UL (ref 0–0.7)
IMM GRANULOCYTES NFR BLD AUTO: 0.3 % (ref 0–0.9)
LYMPHOCYTES # BLD AUTO: 2.26 X10*3/UL (ref 1.2–4.8)
LYMPHOCYTES NFR BLD AUTO: 36.1 %
MCH RBC QN AUTO: 28.9 PG (ref 26–34)
MCHC RBC AUTO-ENTMCNC: 31.8 G/DL (ref 32–36)
MCV RBC AUTO: 91 FL (ref 80–100)
METHADONE UR QL SCN: ABNORMAL
MONOCYTES # BLD AUTO: 0.55 X10*3/UL (ref 0.1–1)
MONOCYTES NFR BLD AUTO: 8.8 %
NEUTROPHILS # BLD AUTO: 3.18 X10*3/UL (ref 1.2–7.7)
NEUTROPHILS NFR BLD AUTO: 50.8 %
NRBC BLD-RTO: 0 /100 WBCS (ref 0–0)
OPIATES UR QL SCN: ABNORMAL
OXYCODONE+OXYMORPHONE UR QL SCN: ABNORMAL
P AXIS: 63 DEGREES
P OFFSET: 196 MS
P ONSET: 141 MS
PCP UR QL SCN: ABNORMAL
PLATELET # BLD AUTO: 206 X10*3/UL (ref 150–450)
POTASSIUM SERPL-SCNC: 3.8 MMOL/L (ref 3.5–5.3)
PR INTERVAL: 154 MS
PREGNANCY TEST URINE, POC: NEGATIVE
PROT SERPL-MCNC: 6.8 G/DL (ref 6.4–8.2)
Q ONSET: 218 MS
QRS COUNT: 11 BEATS
QRS DURATION: 90 MS
QT INTERVAL: 404 MS
QTC CALCULATION(BAZETT): 420 MS
QTC FREDERICIA: 414 MS
R AXIS: 56 DEGREES
RBC # BLD AUTO: 3.36 X10*6/UL (ref 4–5.2)
SALICYLATES SERPL-MCNC: <3 MG/DL
SODIUM SERPL-SCNC: 137 MMOL/L (ref 136–145)
T AXIS: 48 DEGREES
T OFFSET: 420 MS
VENTRICULAR RATE: 65 BPM
WBC # BLD AUTO: 6.3 X10*3/UL (ref 4.4–11.3)

## 2024-07-20 PROCEDURE — 80307 DRUG TEST PRSMV CHEM ANLYZR: CPT | Performed by: EMERGENCY MEDICINE

## 2024-07-20 PROCEDURE — 2020000001 HC ICU ROOM DAILY

## 2024-07-20 PROCEDURE — 84075 ASSAY ALKALINE PHOSPHATASE: CPT | Performed by: EMERGENCY MEDICINE

## 2024-07-20 PROCEDURE — 96374 THER/PROPH/DIAG INJ IV PUSH: CPT

## 2024-07-20 PROCEDURE — 93005 ELECTROCARDIOGRAM TRACING: CPT

## 2024-07-20 PROCEDURE — 96361 HYDRATE IV INFUSION ADD-ON: CPT

## 2024-07-20 PROCEDURE — 96376 TX/PRO/DX INJ SAME DRUG ADON: CPT

## 2024-07-20 PROCEDURE — 87522 HEPATITIS C REVRS TRNSCRPJ: CPT | Mod: ELYLAB | Performed by: STUDENT IN AN ORGANIZED HEALTH CARE EDUCATION/TRAINING PROGRAM

## 2024-07-20 PROCEDURE — 81025 URINE PREGNANCY TEST: CPT | Performed by: EMERGENCY MEDICINE

## 2024-07-20 PROCEDURE — 99291 CRITICAL CARE FIRST HOUR: CPT | Performed by: NURSE PRACTITIONER

## 2024-07-20 PROCEDURE — 2500000004 HC RX 250 GENERAL PHARMACY W/ HCPCS (ALT 636 FOR OP/ED): Performed by: EMERGENCY MEDICINE

## 2024-07-20 PROCEDURE — 85025 COMPLETE CBC W/AUTO DIFF WBC: CPT | Performed by: EMERGENCY MEDICINE

## 2024-07-20 PROCEDURE — 2500000004 HC RX 250 GENERAL PHARMACY W/ HCPCS (ALT 636 FOR OP/ED): Performed by: NURSE PRACTITIONER

## 2024-07-20 PROCEDURE — 99291 CRITICAL CARE FIRST HOUR: CPT | Performed by: EMERGENCY MEDICINE

## 2024-07-20 PROCEDURE — 80179 DRUG ASSAY SALICYLATE: CPT | Performed by: EMERGENCY MEDICINE

## 2024-07-20 PROCEDURE — 81025 URINE PREGNANCY TEST: CPT

## 2024-07-20 PROCEDURE — 36415 COLL VENOUS BLD VENIPUNCTURE: CPT | Performed by: EMERGENCY MEDICINE

## 2024-07-20 RX ORDER — NALOXONE HYDROCHLORIDE 1 MG/ML
2 INJECTION INTRAMUSCULAR; INTRAVENOUS; SUBCUTANEOUS ONCE
Status: COMPLETED | OUTPATIENT
Start: 2024-07-20 | End: 2024-07-20

## 2024-07-20 RX ORDER — HYDRALAZINE HYDROCHLORIDE 20 MG/ML
10 INJECTION INTRAMUSCULAR; INTRAVENOUS EVERY 4 HOURS PRN
Status: DISCONTINUED | OUTPATIENT
Start: 2024-07-20 | End: 2024-07-21

## 2024-07-20 RX ORDER — SODIUM CHLORIDE, SODIUM LACTATE, POTASSIUM CHLORIDE, CALCIUM CHLORIDE 600; 310; 30; 20 MG/100ML; MG/100ML; MG/100ML; MG/100ML
75 INJECTION, SOLUTION INTRAVENOUS CONTINUOUS
Status: DISCONTINUED | OUTPATIENT
Start: 2024-07-20 | End: 2024-07-21

## 2024-07-20 RX ORDER — NALOXONE HYDROCHLORIDE 1 MG/ML
4 INJECTION INTRAMUSCULAR; INTRAVENOUS; SUBCUTANEOUS ONCE
Status: COMPLETED | OUTPATIENT
Start: 2024-07-20 | End: 2024-07-20

## 2024-07-20 RX ADMIN — SODIUM CHLORIDE 1000 ML: 9 INJECTION, SOLUTION INTRAVENOUS at 15:19

## 2024-07-20 RX ADMIN — SODIUM CHLORIDE, SODIUM LACTATE, POTASSIUM CHLORIDE, AND CALCIUM CHLORIDE 75 ML/HR: 600; 310; 30; 20 INJECTION, SOLUTION INTRAVENOUS at 19:39

## 2024-07-20 RX ADMIN — HYDRALAZINE HYDROCHLORIDE 10 MG: 20 INJECTION INTRAMUSCULAR; INTRAVENOUS at 19:56

## 2024-07-20 RX ADMIN — NALOXONE HYDROCHLORIDE 2 MG: 1 INJECTION INTRAMUSCULAR; INTRAVENOUS; SUBCUTANEOUS at 13:27

## 2024-07-20 RX ADMIN — NALOXONE HYDROCHLORIDE 0.4 MG/HR: 0.4 INJECTION, SOLUTION INTRAMUSCULAR; INTRAVENOUS; SUBCUTANEOUS at 18:02

## 2024-07-20 RX ADMIN — NALOXONE HYDROCHLORIDE 4 MG: 1 INJECTION INTRAMUSCULAR; INTRAVENOUS; SUBCUTANEOUS at 17:41

## 2024-07-20 RX ADMIN — NALOXONE HYDROCHLORIDE 2 MG: 1 INJECTION INTRAMUSCULAR; INTRAVENOUS; SUBCUTANEOUS at 12:28

## 2024-07-20 RX ADMIN — NALOXONE HYDROCHLORIDE 2 MG: 1 INJECTION INTRAMUSCULAR; INTRAVENOUS; SUBCUTANEOUS at 13:57

## 2024-07-20 ASSESSMENT — PAIN - FUNCTIONAL ASSESSMENT
PAIN_FUNCTIONAL_ASSESSMENT: 0-10
PAIN_FUNCTIONAL_ASSESSMENT: 0-10

## 2024-07-20 ASSESSMENT — LIFESTYLE VARIABLES
EVER HAD A DRINK FIRST THING IN THE MORNING TO STEADY YOUR NERVES TO GET RID OF A HANGOVER: NO
TOTAL_SCORE: 0
TOTAL SCORE: 0
HAVE PEOPLE ANNOYED YOU BY CRITICIZING YOUR DRINKING: NO
EVER FELT BAD OR GUILTY ABOUT YOUR DRINKING: NO
HAVE YOU EVER FELT YOU SHOULD CUT DOWN ON YOUR DRINKING: NO

## 2024-07-20 ASSESSMENT — PAIN SCALES - GENERAL
PAINLEVEL_OUTOF10: 0 - NO PAIN

## 2024-07-20 ASSESSMENT — COLUMBIA-SUICIDE SEVERITY RATING SCALE - C-SSRS
1. IN THE PAST MONTH, HAVE YOU WISHED YOU WERE DEAD OR WISHED YOU COULD GO TO SLEEP AND NOT WAKE UP?: NO
2. HAVE YOU ACTUALLY HAD ANY THOUGHTS OF KILLING YOURSELF?: NO
6. HAVE YOU EVER DONE ANYTHING, STARTED TO DO ANYTHING, OR PREPARED TO DO ANYTHING TO END YOUR LIFE?: NO

## 2024-07-20 NOTE — H&P
".[facility] Critical Care Medicine       Date:  7/20/2024  Patient:  Evi Centeno  YOB: 1990  MRN:  [id number]   Admit Date:  7/20/2024  ========================================================================================================    Chief Complaint   Patient presents with    Ingestion    Drug Overdose     Pt was brought in by [employer]  for drug overdose.  Pt  arrived to ED obtunded, responsive only to painful stimuli         History of Present Illness:  Evi Centeno is a 34 y.o. female [place] by the  and brought today, patient upon arrival was very somnolent but following commands admit to using drugs was given Narcan 2 mg and is awake alert to yawning stating that she was arrested because she was using dope, she was using fentanyl to get high, denies any suicidal homicidal ideation.     Urine tox positive for amphetamine, fentanyl, benzodiazepines, and cocaine.  She was given Narcan 3 times in the emergency department.  She initially improved however became more somnolent and so was placed on a Narcan IV infusion.  She is protecting her airway.  Tylenol, salicylate, clonazepam levels normal.  Chemistry and CBC unremarkable.  Some baseline anemia with hemoglobin 9.7.    Admitted to the ICU for further monitoring.       Interval ICU Events:      Medical History:  No past medical history on file.  Past Surgical History:   Procedure Laterality Date    OTHER SURGICAL HISTORY  09/16/2019    No history of surgery     (Not in a hospital admission)    Clonidine     No family history on file.    Review of Systems:  Unable as patient is somnolent.    Physical Exam:    Heart Rate:  [56-72]   Temperature:  [36.1 °C (97 °F)]   Respirations:  [12-18]   BP: ()/()   Height:  [167.6 cm (5' 6\")]   Weight:  [63.5 kg (140 lb)]   Pulse Ox:  [92 %-100 %]     Physical Exam  Constitutional:       Appearance: Normal appearance.   Eyes:      Pupils: " Pupils are equal, round, and reactive to light.   Cardiovascular:      Rate and Rhythm: Normal rate and regular rhythm.   Pulmonary:      Effort: Pulmonary effort is normal.      Breath sounds: Normal breath sounds.   Abdominal:      Palpations: Abdomen is soft.   Musculoskeletal:         General: Normal range of motion.   Skin:     General: Skin is warm.   Neurological:      General: No focal deficit present.      Mental Status: She is alert and oriented to person, place, and time.      Cranial Nerves: No cranial nerve deficit.      Sensory: Sensory deficit present.      Motor: No weakness.      Coordination: Coordination normal.     Objective:    I have reviewed all medications, laboratory results, and imaging pertinent for today's encounter    Assessment/Plan:    I am currently managing this critically ill patient for the following problems:    Neuro/Psych/Pain Ctrl/Sedation:  Denies suicidal ideation.  Continue Narcan infusion.    Respiratory/ENT:  No acute events or issues.  She is protecting her airway.  Monitor SpO2 continuously.  Apply end-tidal CO2.    Cardiovascular:  Hypertensive in the ED.  Systolics 180s.  Likely secondary to cocaine/amphetamine.  As needed hydralazine to keep SBP less than 160.    GI:  N.p.o. for now    Renal/Volume Status (Intra & Extravascular):  LR at 75 mL/hour  Monitor normalize electrolytes    Endocrine  No acute issues.  Monitor glucose.    Infectious Disease:  No acute issues.    Heme/Onc:  No acute issues.  Urine hCG negative    OBGYN/MSK:  No acute issues. Urine hCG negative    Ethics/Code Status:  Full code    :  DVT Prophylaxis: Defer, start tomorrow if needed.  GI Prophylaxis: Defer.  Bowel Regimen: Defer.    Diet: N.p.o.  CVC: No  [place]: No  [place]: No  Restraints: No  Dispo: Admit to ICU.    Critical Care Time: 35 minutes.    Francisco Ramsey, APRN-CNP, DNP

## 2024-07-20 NOTE — ED PROVIDER NOTES
HPI   Chief Complaint   Patient presents with    Ingestion    Drug Overdose     Pt was brought in by [facility] for drug overdose.  Pt  arrived to ED obtunded, responsive only to painful stimuli       Patient is a 34-year-old female [place] by the  and brought today, patient upon arrival was very somnolent but following commands admit to using drugs was given Narcan 2 mg and is awake alert to yawning stating that she was arrested because she was using dope, she was using fentanyl to get high, denies any suicidal homicidal ideation              Patient History   No past medical history on file.  Past Surgical History:   Procedure Laterality Date    OTHER SURGICAL HISTORY  09/16/2019    No history of surgery     No family history on file.  Social History     Tobacco Use    Smoking status: Not on file    Smokeless tobacco: Not on file   Substance Use Topics    Alcohol use: Not on file    Drug use: Not on file       Physical Exam   ED Triage Vitals [07/20/24 1220]   Temperature Heart Rate Respirations BP   36.1 °C (97 °F) 67 12 92/66      Pulse Ox Temp Source Heart Rate Source Patient Position   (!) 92 % Temporal Monitor Lying      BP Location FiO2 (%)     Left arm --       Physical Exam  Vitals and nursing note reviewed.   Constitutional:       Appearance: Normal appearance.   Eyes:      Pupils: Pupils are equal, round, and reactive to light.   Cardiovascular:      Rate and Rhythm: Normal rate and regular rhythm.   Pulmonary:      Effort: Pulmonary effort is normal.      Breath sounds: Normal breath sounds.   Abdominal:      Palpations: Abdomen is soft.   Musculoskeletal:         General: Normal range of motion.   Skin:     General: Skin is warm.   Neurological:      General: No focal deficit present.      Mental Status: She is alert and oriented to person, place, and time.      Cranial Nerves: No cranial nerve deficit.      Sensory: Sensory deficit present.      Motor: No  weakness.      Coordination: Coordination normal.           ED Course & MDM   Diagnoses as of 07/20/24 1848   Accidental drug overdose, initial encounter   Substance abuse (Multi)   Somnolence                       [place] Coma Scale Score: 15                        Medical Decision Making  EKG interpreted by me shows normal sinus rhythm rate of 65 normal QRS and normal ST segments  My differential diagnosis  Acute drug overdose, acute opiate overdose, recreational drug abuse  Patient after getting Narcan was more awake and alert stated that she does do for recreation at this time plan is to observe the patient get a drug screen toxicology panel and lab work and will reevaluate in an hour.  Patient's toxin was positive for benzos amphetamines cocaine and fentanyl, after patient got the 2 mg of Narcan in the ED she was awake and alert and then started dozing off and then became sleeping with shallow respirations was given another 2 mg of Narcan x 2 and after every time the Narcan.  Patient gets very somnolent shallow respirations poorly responsive at this time has been several hours at 540 to be given another 2 mg of IV Narcan patient woke right up decision was made to admit the patient on a Narcan drip she lacks capacity and will be admitted to the hospital.  Labs Reviewed  CBC WITH AUTO DIFFERENTIAL - Abnormal     WBC                           6.3                    nRBC                          0.0                    RBC                           3.36 (*)               Hemoglobin                    9.7 (*)                Hematocrit                    30.5 (*)               MCV                           91                     MCH                           28.9                   MCHC                          31.8 (*)               RDW                           13.6                   Platelets                     206                    Neutrophils %                 50.8                   Immature Granulocytes %,  Automated   0.3                    Lymphocytes %                 36.1                   Monocytes %                   8.8                    Eosinophils %                 3.5                    Basophils %                   0.5                    Neutrophils Absolute          3.18                   Immature Granulocytes Absolute, Au*   0.02                   Lymphocytes Absolute          2.26                   Monocytes Absolute            0.55                   Eosinophils Absolute          0.22                   Basophils Absolute            0.03                COMPREHENSIVE METABOLIC PANEL - Abnormal     Glucose                       122 (*)                Sodium                        137                    Potassium                     3.8                    Chloride                      103                    Bicarbonate                   32                     Anion Gap                     <7 (*)                 Urea Nitrogen                 11                     Creatinine                    0.65                   eGFR                          >90                    Calcium                       9.0                    Albumin                       3.8                    Alkaline Phosphatase          68                     Total Protein                 6.8                    AST                           36                     Bilirubin, Total              0.2                    ALT                           41                  DRUG SCREEN,URINE - Abnormal     Amphetamine Screen, Urine       (*)                  Barbiturate Screen, Urine                            Benzodiazepines Screen, Urine     (*)                  Cannabinoid Screen, Urine                            Cocaine Metabolite Screen, Urine     (*)                  Fentanyl Screen, Urine          (*)                  Opiate Screen, Urine                                 Oxycodone Screen, Urine                              PCP Screen, Urine                                     Methadone Screen, Urine                                Narrative: Drug screen results are presumptive and should not be used to assess                 compliance with prescribed medication. Contact the performing [facility] laboratory                 to add-on definitive confirmatory testing if clinically indicated.                                Toxicology screening results are reported qualitatively. The concentration must                 be greater than or equal to the cutoff to be reported as positive. The concentration                 at which the screening test can detect an individual drug or metabolite varies.                 The absence of expected drug(s) and/or drug metabolite(s) may indicate non-compliance,                 inappropriate timing of specimen collection relative to drug administration, poor drug                 absorption, diluted/adulterated urine, or limitations of testing. For medical purposes                 only; not valid for forensic use.                                Interpretive questions should be directed to the laboratory medical directors.  ACUTE TOXICOLOGY PANEL, BLOOD - Normal     Acetaminophen                 <10.0                  Salicylate                    <3                     Alcohol                       <10                 HCG, URINE, QUALITATIVE - Normal     HCG, Urine                    NEGATIVE             GRAY TOP     Extra Tube                                        POCT PREGNANCY, URINE     No orders to display           Procedure  Critical Care    Performed by: Cheryl Martinez MD  Authorized by: Cheryl Martinez MD    Critical care provider statement:     Critical care time (minutes):  35    Critical care time was exclusive of:  Separately billable procedures and treating other patients    Critical care was necessary to treat or prevent imminent or life-threatening deterioration of the following conditions:  [place]    Critical care  was time spent personally by me on the following activities:  Development of treatment plan with patient or surrogate, evaluation of patient's response to treatment, examination of patient, obtaining history from patient or surrogate, ordering and performing treatments and interventions, ordering and review of laboratory studies, ordering and review of radiographic studies, re-evaluation of patient's condition and review of old charts    Care discussed with: admitting provider         Cheryl Martinez MD  07/20/24 0426

## 2024-07-21 ENCOUNTER — APPOINTMENT (OUTPATIENT)
Dept: RADIOLOGY | Facility: HOSPITAL | Age: 34
End: 2024-07-21

## 2024-07-21 VITALS
BODY MASS INDEX: 24.59 KG/M2 | TEMPERATURE: 98.8 F | HEIGHT: 66 IN | SYSTOLIC BLOOD PRESSURE: 115 MMHG | RESPIRATION RATE: 16 BRPM | OXYGEN SATURATION: 98 % | WEIGHT: 153 LBS | DIASTOLIC BLOOD PRESSURE: 59 MMHG | HEART RATE: 75 BPM

## 2024-07-21 LAB
HOLD SPECIMEN: NORMAL
HOLD SPECIMEN: NORMAL

## 2024-07-21 PROCEDURE — 36415 COLL VENOUS BLD VENIPUNCTURE: CPT | Performed by: STUDENT IN AN ORGANIZED HEALTH CARE EDUCATION/TRAINING PROGRAM

## 2024-07-21 PROCEDURE — 2500000001 HC RX 250 WO HCPCS SELF ADMINISTERED DRUGS (ALT 637 FOR MEDICARE OP)

## 2024-07-21 PROCEDURE — 2500000004 HC RX 250 GENERAL PHARMACY W/ HCPCS (ALT 636 FOR OP/ED): Performed by: NURSE PRACTITIONER

## 2024-07-21 PROCEDURE — 70450 CT HEAD/BRAIN W/O DYE: CPT

## 2024-07-21 PROCEDURE — 76705 ECHO EXAM OF ABDOMEN: CPT

## 2024-07-21 PROCEDURE — 2500000004 HC RX 250 GENERAL PHARMACY W/ HCPCS (ALT 636 FOR OP/ED)

## 2024-07-21 PROCEDURE — 1200000002 HC GENERAL ROOM WITH TELEMETRY DAILY

## 2024-07-21 PROCEDURE — 86803 HEPATITIS C AB TEST: CPT | Mod: ELYLAB | Performed by: STUDENT IN AN ORGANIZED HEALTH CARE EDUCATION/TRAINING PROGRAM

## 2024-07-21 PROCEDURE — 74177 CT ABD & PELVIS W/CONTRAST: CPT | Performed by: RADIOLOGY

## 2024-07-21 PROCEDURE — 99232 SBSQ HOSP IP/OBS MODERATE 35: CPT | Performed by: STUDENT IN AN ORGANIZED HEALTH CARE EDUCATION/TRAINING PROGRAM

## 2024-07-21 PROCEDURE — 70450 CT HEAD/BRAIN W/O DYE: CPT | Performed by: RADIOLOGY

## 2024-07-21 PROCEDURE — 74177 CT ABD & PELVIS W/CONTRAST: CPT

## 2024-07-21 PROCEDURE — 2550000001 HC RX 255 CONTRASTS: Performed by: STUDENT IN AN ORGANIZED HEALTH CARE EDUCATION/TRAINING PROGRAM

## 2024-07-21 PROCEDURE — 76705 ECHO EXAM OF ABDOMEN: CPT | Performed by: RADIOLOGY

## 2024-07-21 RX ORDER — METOCLOPRAMIDE HYDROCHLORIDE 5 MG/ML
10 INJECTION INTRAMUSCULAR; INTRAVENOUS ONCE
Status: COMPLETED | OUTPATIENT
Start: 2024-07-21 | End: 2024-07-21

## 2024-07-21 RX ORDER — KETOROLAC TROMETHAMINE 30 MG/ML
15 INJECTION, SOLUTION INTRAMUSCULAR; INTRAVENOUS ONCE
Status: COMPLETED | OUTPATIENT
Start: 2024-07-21 | End: 2024-07-21

## 2024-07-21 RX ORDER — DOCUSATE SODIUM 100 MG/1
100 CAPSULE, LIQUID FILLED ORAL 2 TIMES DAILY
Status: DISCONTINUED | OUTPATIENT
Start: 2024-07-21 | End: 2024-07-22 | Stop reason: HOSPADM

## 2024-07-21 RX ADMIN — METOCLOPRAMIDE HYDROCHLORIDE 10 MG: 5 INJECTION INTRAMUSCULAR; INTRAVENOUS at 09:38

## 2024-07-21 RX ADMIN — SODIUM CHLORIDE, SODIUM LACTATE, POTASSIUM CHLORIDE, AND CALCIUM CHLORIDE 75 ML/HR: 600; 310; 30; 20 INJECTION, SOLUTION INTRAVENOUS at 06:01

## 2024-07-21 RX ADMIN — KETOROLAC TROMETHAMINE 15 MG: 30 INJECTION, SOLUTION INTRAMUSCULAR at 09:38

## 2024-07-21 RX ADMIN — NALOXONE HYDROCHLORIDE 0.4 MG/HR: 0.4 INJECTION, SOLUTION INTRAMUSCULAR; INTRAVENOUS; SUBCUTANEOUS at 06:00

## 2024-07-21 RX ADMIN — SODIUM CHLORIDE, POTASSIUM CHLORIDE, SODIUM LACTATE AND CALCIUM CHLORIDE 1000 ML: 600; 310; 30; 20 INJECTION, SOLUTION INTRAVENOUS at 09:38

## 2024-07-21 RX ADMIN — DOCUSATE SODIUM 100 MG: 100 CAPSULE, LIQUID FILLED ORAL at 09:38

## 2024-07-21 RX ADMIN — IOHEXOL 75 ML: 350 INJECTION, SOLUTION INTRAVENOUS at 10:21

## 2024-07-21 SDOH — SOCIAL STABILITY: SOCIAL INSECURITY: HAVE YOU HAD THOUGHTS OF HARMING ANYONE ELSE?: NO

## 2024-07-21 SDOH — SOCIAL STABILITY: SOCIAL INSECURITY: WERE YOU ABLE TO COMPLETE ALL THE BEHAVIORAL HEALTH SCREENINGS?: YES

## 2024-07-21 SDOH — SOCIAL STABILITY: SOCIAL INSECURITY: ABUSE: ADULT

## 2024-07-21 SDOH — SOCIAL STABILITY: SOCIAL INSECURITY: HAVE YOU HAD ANY THOUGHTS OF HARMING ANYONE ELSE?: NO

## 2024-07-21 SDOH — SOCIAL STABILITY: SOCIAL INSECURITY: DOES ANYONE TRY TO KEEP YOU FROM HAVING/CONTACTING OTHER FRIENDS OR DOING THINGS OUTSIDE YOUR HOME?: NO

## 2024-07-21 SDOH — SOCIAL STABILITY: SOCIAL INSECURITY: DO YOU FEEL ANYONE HAS EXPLOITED OR TAKEN ADVANTAGE OF YOU FINANCIALLY OR OF YOUR PERSONAL PROPERTY?: NO

## 2024-07-21 SDOH — SOCIAL STABILITY: SOCIAL INSECURITY: DO YOU FEEL UNSAFE GOING BACK TO THE PLACE WHERE YOU ARE LIVING?: NO

## 2024-07-21 SDOH — SOCIAL STABILITY: SOCIAL INSECURITY: ARE YOU OR HAVE YOU BEEN THREATENED OR ABUSED PHYSICALLY, EMOTIONALLY, OR SEXUALLY BY ANYONE?: NO

## 2024-07-21 SDOH — SOCIAL STABILITY: SOCIAL INSECURITY: ARE THERE ANY APPARENT SIGNS OF INJURIES/BEHAVIORS THAT COULD BE RELATED TO ABUSE/NEGLECT?: NO

## 2024-07-21 SDOH — SOCIAL STABILITY: SOCIAL INSECURITY: HAS ANYONE EVER THREATENED TO HURT YOUR FAMILY OR YOUR PETS?: NO

## 2024-07-21 ASSESSMENT — LIFESTYLE VARIABLES
SUBSTANCE_ABUSE_PAST_12_MONTHS: YES
SKIP TO QUESTIONS 9-10: 1
AUDIT-C TOTAL SCORE: 0
HOW MANY STANDARD DRINKS CONTAINING ALCOHOL DO YOU HAVE ON A TYPICAL DAY: PATIENT DOES NOT DRINK
HOW OFTEN DO YOU HAVE 6 OR MORE DRINKS ON ONE OCCASION: NEVER
AUDIT-C TOTAL SCORE: 0
HOW OFTEN DO YOU HAVE 6 OR MORE DRINKS ON ONE OCCASION: NEVER
HOW OFTEN DO YOU HAVE A DRINK CONTAINING ALCOHOL: NEVER
PRESCIPTION_ABUSE_PAST_12_MONTHS: YES
HOW MANY STANDARD DRINKS CONTAINING ALCOHOL DO YOU HAVE ON A TYPICAL DAY: PATIENT DOES NOT DRINK
HOW OFTEN DO YOU HAVE A DRINK CONTAINING ALCOHOL: NEVER
HOW MANY STANDARD DRINKS CONTAINING ALCOHOL DO YOU HAVE ON A TYPICAL DAY: PATIENT DOES NOT DRINK
PRESCIPTION_ABUSE_PAST_12_MONTHS: YES
HOW OFTEN DO YOU HAVE A DRINK CONTAINING ALCOHOL: NEVER
AUDIT-C TOTAL SCORE: 0
HOW OFTEN DO YOU HAVE 6 OR MORE DRINKS ON ONE OCCASION: NEVER
SUBSTANCE_ABUSE_PAST_12_MONTHS: YES
SUBSTANCE_ABUSE_PAST_12_MONTHS: YES
HOW OFTEN DO YOU HAVE A DRINK CONTAINING ALCOHOL: NEVER
SKIP TO QUESTIONS 9-10: 1
AUDIT-C TOTAL SCORE: 0
SKIP TO QUESTIONS 9-10: 1
AUDIT-C TOTAL SCORE: 0
PRESCIPTION_ABUSE_PAST_12_MONTHS: YES
AUDIT-C TOTAL SCORE: 0
AUDIT-C TOTAL SCORE: 0
HOW MANY STANDARD DRINKS CONTAINING ALCOHOL DO YOU HAVE ON A TYPICAL DAY: PATIENT DOES NOT DRINK
HOW OFTEN DO YOU HAVE 6 OR MORE DRINKS ON ONE OCCASION: NEVER
AUDIT-C TOTAL SCORE: 0
SKIP TO QUESTIONS 9-10: 1

## 2024-07-21 ASSESSMENT — COGNITIVE AND FUNCTIONAL STATUS - GENERAL
DAILY ACTIVITIY SCORE: 24
PATIENT BASELINE BEDBOUND: NO
MOBILITY SCORE: 24

## 2024-07-21 ASSESSMENT — PATIENT HEALTH QUESTIONNAIRE - PHQ9
1. LITTLE INTEREST OR PLEASURE IN DOING THINGS: MORE THAN HALF THE DAYS
SUM OF ALL RESPONSES TO PHQ9 QUESTIONS 1 & 2: 4
2. FEELING DOWN, DEPRESSED OR HOPELESS: NEARLY EVERY DAY
SUM OF ALL RESPONSES TO PHQ9 QUESTIONS 1 & 2: 4
SUM OF ALL RESPONSES TO PHQ9 QUESTIONS 1 & 2: 6
2. FEELING DOWN, DEPRESSED OR HOPELESS: MORE THAN HALF THE DAYS
1. LITTLE INTEREST OR PLEASURE IN DOING THINGS: MORE THAN HALF THE DAYS
SUM OF ALL RESPONSES TO PHQ9 QUESTIONS 1 & 2: 4
1. LITTLE INTEREST OR PLEASURE IN DOING THINGS: MORE THAN HALF THE DAYS
1. LITTLE INTEREST OR PLEASURE IN DOING THINGS: NEARLY EVERY DAY

## 2024-07-21 ASSESSMENT — ACTIVITIES OF DAILY LIVING (ADL)
WALKS IN HOME: INDEPENDENT
FEEDING YOURSELF: INDEPENDENT
HEARING - LEFT EAR: FUNCTIONAL
HEARING - RIGHT EAR: FUNCTIONAL
ADEQUATE_TO_COMPLETE_ADL: YES
TOILETING: INDEPENDENT
DRESSING YOURSELF: INDEPENDENT
DRESSING YOURSELF: INDEPENDENT
PATIENT'S MEMORY ADEQUATE TO SAFELY COMPLETE DAILY ACTIVITIES?: YES
LACK_OF_TRANSPORTATION: YES
BATHING: INDEPENDENT
GROOMING: INDEPENDENT
FEEDING YOURSELF: INDEPENDENT
BATHING: INDEPENDENT
JUDGMENT_ADEQUATE_SAFELY_COMPLETE_DAILY_ACTIVITIES: YES
TOILETING: INDEPENDENT
LACK_OF_TRANSPORTATION: YES
WALKS IN HOME: INDEPENDENT
GROOMING: INDEPENDENT
HEARING - LEFT EAR: FUNCTIONAL
ADEQUATE_TO_COMPLETE_ADL: YES
HEARING - RIGHT EAR: FUNCTIONAL
LACK_OF_TRANSPORTATION: YES
JUDGMENT_ADEQUATE_SAFELY_COMPLETE_DAILY_ACTIVITIES: YES

## 2024-07-21 ASSESSMENT — PAIN - FUNCTIONAL ASSESSMENT
PAIN_FUNCTIONAL_ASSESSMENT: 0-10

## 2024-07-21 ASSESSMENT — COLUMBIA-SUICIDE SEVERITY RATING SCALE - C-SSRS
2. HAVE YOU ACTUALLY HAD ANY THOUGHTS OF KILLING YOURSELF?: NO
6. HAVE YOU EVER DONE ANYTHING, STARTED TO DO ANYTHING, OR PREPARED TO DO ANYTHING TO END YOUR LIFE?: NO
1. IN THE PAST MONTH, HAVE YOU WISHED YOU WERE DEAD OR WISHED YOU COULD GO TO SLEEP AND NOT WAKE UP?: NO

## 2024-07-21 ASSESSMENT — PAIN SCALES - GENERAL
PAINLEVEL_OUTOF10: 0 - NO PAIN
PAINLEVEL_OUTOF10: 0 - NO PAIN
PAINLEVEL_OUTOF10: 5 - MODERATE PAIN
PAINLEVEL_OUTOF10: 1
PAINLEVEL_OUTOF10: 0 - NO PAIN

## 2024-07-21 NOTE — CARE PLAN
The patient's goals for the shift include Patient will experience no repsiratory depression    The clinical goals for the shift include Patient's sp02 will remain greater than 90% for the remainder of the shift.    Over the shift, the patient did make progress toward the following goals.     Problem: Pain - Adult  Goal: Verbalizes/displays adequate comfort level or baseline comfort level  Outcome: Progressing     Problem: Safety - Adult  Goal: Free from fall injury  Outcome: Progressing     Problem: Discharge Planning  Goal: Discharge to home or other facility with appropriate resources  Outcome: Progressing     Problem: Chronic Conditions and Co-morbidities  Goal: Patient's chronic conditions and co-morbidity symptoms are monitored and maintained or improved  Outcome: Progressing

## 2024-07-21 NOTE — SIGNIFICANT EVENT
Dr. Limon at bedside to see patient. Aware of respirations less than 10. Dr. Limon not concerned at this time since patient's sp02 is 100% and on continuous capnography.

## 2024-07-21 NOTE — NURSING NOTE
Patient arrived from ER,  transferred to bed 6, arousable  to verbal stimuli, able to state her name and where she is but not why she is here or what brought her to the ER, VSS, ETC02 WNL. States she takes no home medications

## 2024-07-21 NOTE — PROGRESS NOTES
[facility] Critical Care Medicine   Progress Note      Date:  7/21/2024  Patient:  Evi Centeno  YOB: 1990  MRN:  [id number]   Admit Date:  7/20/2024  ========================================================================================================    Chief Complaint   Patient presents with    Ingestion    Drug Overdose     Pt was brought in by [facility]  for drug overdose.  Pt  arrived to ED obtunded, responsive only to painful stimuli     History of Present Illness:  Evi Centeno is a 34 y.o. year old female patient with PMH of Hep C. Patient found in a park by a  and brought in on 7/20/2024. Patient upon arrival was very somnolent but following commands admit to using drugs was given Narcan 2 mg and is awake alert to yawning stating that she was arrested because she was using dope, she was using fentanyl to get high, denies any suicidal homicidal ideation.      Urine tox positive for amphetamine, fentanyl, benzodiazepines, and cocaine.  She was given Narcan 3 times in the emergency department.  She initially improved however became more somnolent and so was placed on a Narcan IV infusion.  She is protecting her airway.  Tylenol, salicylate, clonazepam levels normal.  Chemistry and CBC unremarkable.  Some baseline anemia with hemoglobin 9.7.    Subjective:   Patient sitting up in bed this am eating breakfast. Patient complaining of a headache, dizziness, and stomach pain. Denies SOB, chest pain, n/v, paresthesias and weakness in extremities. Patient states she has a BM approximately once a week and cannot remember when her last BM was.     Interval ICU Events:  7/20: narcan gtt started   7/21: narcan gtt d/c'd. For headache, 1L of LR, 15 mg IV Toradol, and 10 mg IV metoclopramide given. Complained of abdominal pain in the am. Sent for CT scan which showed gallbladder wall thickening. US of gallbladder ordered.     Medical History:  History reviewed. No pertinent past  "medical history.  Past Surgical History:   Procedure Laterality Date    OTHER SURGICAL HISTORY  09/16/2019    No history of surgery     No medications prior to admission.     Clonidine  Social History     Tobacco Use    Smoking status: Never    Smokeless tobacco: Never   Vaping Use    Vaping status: Every Day    Substances: Nicotine    Devices: Disposable, Pre-filled or refillable cartridge   Substance Use Topics    Alcohol use: Never    Drug use: Yes     Types: Fentanyl, Methamphetamines, Heroin     No family history on file.    Review of Systems:  14 point review of systems was completed and negative except for those specially mention in my HPI    Physical Exam:    Heart Rate:  [56-84]   Temp:  [36.1 °C (97 °F)-36.7 °C (98.1 °F)]   Resp:  [9-19]   BP: ()/()   Height:  [167.6 cm (5' 6\")]   Weight:  [63.5 kg (140 lb)-69.4 kg (153 lb)]   SpO2:  [92 %-100 %]     Physical Exam  HENT:      Head: Normocephalic and atraumatic.   Eyes:      Extraocular Movements: Extraocular movements intact.      Pupils: Pupils are equal, round, and reactive to light.   Cardiovascular:      Rate and Rhythm: Normal rate and regular rhythm.   Abdominal:      General: There is distension.      Tenderness: There is abdominal tenderness.   Musculoskeletal:         General: Normal range of motion.      Cervical back: Normal range of motion and neck supple.   Skin:     General: Skin is warm and dry.   Neurological:      General: No focal deficit present.      Mental Status: She is alert and oriented to person, place, and time.   Psychiatric:         Mood and Affect: Mood normal.         Behavior: Behavior normal.         Thought Content: Thought content normal.         Judgment: Judgment normal.       Objective:  I have reviewed all medications, laboratory results, and imaging pertinent for today's encounter    Assessment/Plan:  I am currently managing this critically ill patient for the following problems:    Neuro/Psych/Pain " Ctrl/Sedation:  # Overdose, Resolved    # Headache, Acute  - tox screen positive for fentanyl, amphetamines, benzodiazepines, and cocaine  - narcan gtt d/c'd   - headache likely due to dehydration    - ordered 15 mg IV toradol, 1 L LR, and 10 mg IV metoclopramide     Respiratory/ENT:  No acute concerns at this time.   - continuous SpO2 monitoring  - ET CO2 monitoring while in ICU      Cardiovascular:  No acute concerns at this time.   - EKG on 7/20 showing normal sinus rhythm    GI/:  # Constipation  Chronic   # Ascites  Acute, unresolved   # Hx of Hep C     CT abdomen on 7/21 IMPRESSION:  1. Patchy basilar airspace opacification bilaterally  2. A few tiny hepatic cysts incidentally noted  3. Questionable gallbladder wall thickening with mildly dilated  common bile duct  4. Small volume of ascites  5. Appendix can not be located.    - US gallbladder ordered given CT findings   - GI for follow up outpatient for positive Hep C result  - metoclopramide 10 mg   - Colace ordered   - Regular diet   - layton removed     Renal/Volume Status (Intra & Extravascular):  # Volume Depletion in the setting of drug intoxication  Resolved   - 1L of LR bolus   - Maintenance LR at 75 ml/hr   - continue monitoring electrolytes     Endocrine  No acute concerns at this time.     Infectious Disease:  No acute concerns at this time.     Heme/Onc:  No acute concerns at this time.   - baseline anemia    OBGYN/MSK:  No acute concerns at this time.   - Urine hCG negative upon admission.     Ethics/Code Status:  Full Code    :  DVT Prophylaxis: N/A  GI Prophylaxis: N/A  Bowel Regimen: Colace  Diet: Regular  CVC: N/A  [place]: N/A  [place]: removed   Restraints: none  Dispo: transfer to tele floor; consult social work and TCC     Discussed plan with attending physician, Dr. Limon.     Samira Arango PA-C

## 2024-07-22 VITALS
TEMPERATURE: 97.9 F | WEIGHT: 149.69 LBS | RESPIRATION RATE: 18 BRPM | HEIGHT: 66 IN | HEART RATE: 89 BPM | OXYGEN SATURATION: 99 % | SYSTOLIC BLOOD PRESSURE: 149 MMHG | BODY MASS INDEX: 24.06 KG/M2 | DIASTOLIC BLOOD PRESSURE: 95 MMHG

## 2024-07-22 LAB
ALBUMIN SERPL BCP-MCNC: 3.2 G/DL (ref 3.4–5)
ALP SERPL-CCNC: 61 U/L (ref 33–110)
ALT SERPL W P-5'-P-CCNC: 37 U/L (ref 7–45)
ANION GAP SERPL CALC-SCNC: 7 MMOL/L (ref 10–20)
APTT PPP: 32 SECONDS (ref 27–38)
AST SERPL W P-5'-P-CCNC: 30 U/L (ref 9–39)
BASOPHILS # BLD AUTO: 0.03 X10*3/UL (ref 0–0.1)
BASOPHILS NFR BLD AUTO: 0.4 %
BILIRUB SERPL-MCNC: 0.3 MG/DL (ref 0–1.2)
BUN SERPL-MCNC: 8 MG/DL (ref 6–23)
CALCIUM SERPL-MCNC: 8.5 MG/DL (ref 8.6–10.3)
CHLORIDE SERPL-SCNC: 106 MMOL/L (ref 98–107)
CO2 SERPL-SCNC: 28 MMOL/L (ref 21–32)
CREAT SERPL-MCNC: 0.47 MG/DL (ref 0.5–1.05)
EGFRCR SERPLBLD CKD-EPI 2021: >90 ML/MIN/1.73M*2
EOSINOPHIL # BLD AUTO: 0.22 X10*3/UL (ref 0–0.7)
EOSINOPHIL NFR BLD AUTO: 2.9 %
ERYTHROCYTE [DISTWIDTH] IN BLOOD BY AUTOMATED COUNT: 13.6 % (ref 11.5–14.5)
GLUCOSE SERPL-MCNC: 89 MG/DL (ref 74–99)
HCT VFR BLD AUTO: 32.4 % (ref 36–46)
HCV AB SER QL: REACTIVE
HGB BLD-MCNC: 10.6 G/DL (ref 12–16)
HOLD SPECIMEN: NORMAL
IMM GRANULOCYTES # BLD AUTO: 0.01 X10*3/UL (ref 0–0.7)
IMM GRANULOCYTES NFR BLD AUTO: 0.1 % (ref 0–0.9)
INR PPP: 0.9 (ref 0.9–1.1)
LYMPHOCYTES # BLD AUTO: 2.34 X10*3/UL (ref 1.2–4.8)
LYMPHOCYTES NFR BLD AUTO: 31.4 %
MCH RBC QN AUTO: 28.8 PG (ref 26–34)
MCHC RBC AUTO-ENTMCNC: 32.7 G/DL (ref 32–36)
MCV RBC AUTO: 88 FL (ref 80–100)
MONOCYTES # BLD AUTO: 0.62 X10*3/UL (ref 0.1–1)
MONOCYTES NFR BLD AUTO: 8.3 %
NEUTROPHILS # BLD AUTO: 4.24 X10*3/UL (ref 1.2–7.7)
NEUTROPHILS NFR BLD AUTO: 56.9 %
NRBC BLD-RTO: 0 /100 WBCS (ref 0–0)
PLATELET # BLD AUTO: 217 X10*3/UL (ref 150–450)
POTASSIUM SERPL-SCNC: 3.9 MMOL/L (ref 3.5–5.3)
PROT SERPL-MCNC: 5.8 G/DL (ref 6.4–8.2)
PROTHROMBIN TIME: 10.4 SECONDS (ref 9.8–12.8)
RBC # BLD AUTO: 3.68 X10*6/UL (ref 4–5.2)
SODIUM SERPL-SCNC: 137 MMOL/L (ref 136–145)
WBC # BLD AUTO: 7.5 X10*3/UL (ref 4.4–11.3)

## 2024-07-22 PROCEDURE — 84075 ASSAY ALKALINE PHOSPHATASE: CPT

## 2024-07-22 PROCEDURE — 99233 SBSQ HOSP IP/OBS HIGH 50: CPT | Performed by: INTERNAL MEDICINE

## 2024-07-22 PROCEDURE — 36415 COLL VENOUS BLD VENIPUNCTURE: CPT

## 2024-07-22 PROCEDURE — 85025 COMPLETE CBC W/AUTO DIFF WBC: CPT

## 2024-07-22 PROCEDURE — 85610 PROTHROMBIN TIME: CPT

## 2024-07-22 PROCEDURE — 85730 THROMBOPLASTIN TIME PARTIAL: CPT

## 2024-07-22 ASSESSMENT — COGNITIVE AND FUNCTIONAL STATUS - GENERAL
MOBILITY SCORE: 24
DAILY ACTIVITIY SCORE: 24

## 2024-07-22 ASSESSMENT — PAIN SCALES - GENERAL: PAINLEVEL_OUTOF10: 0 - NO PAIN

## 2024-07-22 NOTE — CARE PLAN
The patient's goals for the shift include Patient will experience no repsiratory depression    The clinical goals for the shift include Patient's sp02 will remain greater than 90% for the remainder of the shift.    Problem: Pain - Adult  Goal: Verbalizes/displays adequate comfort level or baseline comfort level  Outcome: Progressing

## 2024-07-22 NOTE — NURSING NOTE
Patient is alert and talking. VSS. C/O abd pain and tenderness. Patient NPO at midnight for gallblader US and GI was consulted. Social work consulted. Removed Vape pen from patient belongings, placed in bag with patient sticker and put in patient specific bin.

## 2024-07-22 NOTE — CONSULTS
"Department of Internal Medicine  Gastroenterology  Consult note      Reason for Consult: CBD dilation, hepatic steatosis, hep C +.    Chief Complaint: Brought in initially for accidental drug overdose    History Obtained from: Prior medical records and patient    History of Present Illness      The patient is a 34 y.o. female with signficant past medical history of substance abuse who presents for  possible drug overdose.    Patient reportedly found in a park by the  prior to arrival and brought into the hospital, patient upon arrival was very somnolent, but following commands and admitted to using drugs.  Patient was given Narcan and reportedly per prior notes she was \"arrested because she was using dope, she was using fentanyl to get high.\"      Urine tox positive for amphetamine, fentanyl, benzodiazepines, and cocaine.  She was given Narcan 3 times in the emergency department.  She initially improved however became more somnolent and so was placed on a Narcan IV infusion.  Tylenol, salicylate, clonazepam levels normal.  LFTS WNL and CBC unremarkable.  Some baseline anemia with hemoglobin 9.7, improved to 10.6 today.     Admitted to the ICU for further monitoring.      Patient has since been transferred to a medical floor, alert and oriented.   Patient reports she was diagnosed with hepatitis C last year, has not seen a hepatologist..  Patient reports history of substance abuse for the past 4 years.  Patient currently denies alcohol use.  No other significant past medical history.  No prior surgeries.  No family history of liver disease or GI malignancies she is aware of.  Patient denies weight loss.  No hematemesis, melena or hematochezia.    Patient does report left lower abdominal pain that started while she was in the hospital described as stabbing and intermittent.  Patient unsure of aggravating factors.  No nausea/vomiting.  Patient denies any abdominal pain at time of visit today.  Patient " "reports pain is no worse with food able to tolerate solid food yesterday with no pain or nausea/vomiting.  Patient denies constipation having a good sized bowel movement yesterday.  No regular NSAID use.  No fever/chills at home.  Patient denies SI.    Patient reports that she would possibly like to sign herself out today to go home to \"vape.\"  Discussed abnormal imaging findings and possibly obtaining an MRI for further evaluation.  Patient reports she would prefer to follow-up outpatient for any further GI evaluation at this time and that she would like to eat.        Allergies  Clonidine    Current Medication    Current Facility-Administered Medications:     docusate sodium (Colace) capsule 100 mg, 100 mg, oral, BID, TANVIR Kim, 100 mg at 07/21/24 0938    Past Medical History  Active Ambulatory Problems     Diagnosis Date Noted    No Active Ambulatory Problems     Resolved Ambulatory Problems     Diagnosis Date Noted    No Resolved Ambulatory Problems     Past Medical History:   Diagnosis Date    Hepatitis C        Past Surgical History  Past Surgical History:   Procedure Laterality Date    OTHER SURGICAL HISTORY  09/16/2019    No history of surgery       Family History  No family history on file.  No family history of stomach or colon cancer    Social History  TOBACCO:  reports that she has never smoked. She has never used smokeless tobacco.  ETOH:  reports no history of alcohol use.  DRUGS:  reports current drug use. Drugs: Fentanyl, Methamphetamines, and Heroin.  MARITAL STATUS:   OCCUPATION:    Review of Systems  All 10 systems reviewed with the patient/family and negative except for what is mentioned in HPI      PHYSICAL EXAM  VS: BP (!) 147/95 (BP Location: Right arm, Patient Position: Lying) Comment: RN notified  Pulse 72   Temp 36.8 °C (98.2 °F) (Temporal)   Resp 18   Ht 1.676 m (5' 6\")   Wt 67.9 kg (149 lb 11.1 oz)   LMP 06/12/2024 (Approximate)   SpO2 98%   BMI 24.16 kg/m²  Body " mass index is 24.16 kg/m².  Physical Exam  Vitals reviewed.   Constitutional:       General: She is not in acute distress.     Appearance: Normal appearance.   HENT:      Head: Normocephalic.      Nose: Nose normal.      Mouth/Throat:      Mouth: Mucous membranes are moist.      Pharynx: Oropharynx is clear.   Eyes:      Extraocular Movements: Extraocular movements intact.      Conjunctiva/sclera: Conjunctivae normal.      Pupils: Pupils are equal, round, and reactive to light.   Cardiovascular:      Rate and Rhythm: Normal rate and regular rhythm.      Pulses: Normal pulses.      Heart sounds: Normal heart sounds.   Pulmonary:      Effort: Pulmonary effort is normal.      Breath sounds: Normal breath sounds.   Abdominal:      General: Bowel sounds are normal. There is no distension.      Palpations: Abdomen is soft.      Tenderness: There is no abdominal tenderness. There is no guarding or rebound.   Musculoskeletal:         General: Normal range of motion.      Cervical back: Normal range of motion.   Skin:     General: Skin is warm and dry.   Neurological:      General: No focal deficit present.      Mental Status: She is alert and oriented to person, place, and time.   Psychiatric:         Mood and Affect: Mood normal.         Behavior: Behavior normal.          DATA  Recent blood work and relevant radiology and endoscopic studies were reviewed and discussed with the patient   Results from last 7 days   Lab Units 07/22/24  0754   WBC AUTO x10*3/uL 7.5   RBC AUTO x10*6/uL 3.68*   HEMOGLOBIN g/dL 10.6*   HEMATOCRIT % 32.4*   MCV fL 88   MCHC g/dL 32.7   RDW % 13.6   PLATELETS AUTO x10*3/uL 217       Results from last 72 hours   Lab Units 07/22/24  0754   SODIUM mmol/L 137   POTASSIUM mmol/L 3.9   CHLORIDE mmol/L 106   CO2 mmol/L 28   BUN mg/dL 8   CREATININE mg/dL 0.47*   CALCIUM mg/dL 8.5*   PROTEIN TOTAL g/dL 5.8*   BILIRUBIN TOTAL mg/dL 0.3   ALK PHOS U/L 61   AST U/L 30   ALT U/L 37       Results from last 72  hours   Lab Units 07/22/24  0754   INR  0.9             RADIOLOGY REVIEW  CT A/P 7/21/2024:  IMPRESSION:  1. Patchy basilar airspace opacification bilaterally  2. A few tiny hepatic cysts incidentally noted  3. Questionable gallbladder wall thickening with mildly dilated  common bile duct  4. Small volume of ascites  5. Appendix can not be located.    Gallbladder ultrasound 7/21/2024:  The gallbladder is nondistended, and demonstrates no evidence of  gallstones, wall thickening or surrounding fluid.         BILE DUCTS:  Common bile duct is dilated measuring up to 1.1 cm in the proximal  portion.          IMPRESSION:  1. Findings suggesting hepatic steatosis. Correlate with hepatic  function studies.  2. Dilation of the common bile duct in the proximal portion measuring  up to 1.1 cm which may be in part technique related. No intrahepatic  biliary dilation. However correlation is recommended with laboratory  values for common bile duct obstruction. MRCP without contrast may be  obtained for further evaluation if clinically warranted.  3. Incidental 3 mm pedunculated polyp within the gallbladder. No  follow-up is suggested for this per ultrasound society criteria.  Normal thickness of the gallbladder wall.        ENDOSCOPIC REVIEW  No prior EGD or colonoscopy    IMPRESSION/RECOMMENDATIONS  The patient is a 34 y.o. female with signficant past medical history of substance abuse who presents for accidental drug overdose.      #Abnormal CT-mildly dilated CBD at 8 mm, few tiny hepatic cyst, small ascites, questionable gallbladder wall thickening, but LFTs WNL, no leukocytosis and afebrile.    Gallbladder ultrasound notes CBD dilated up to 1.1 cm, but No evidence of gallstones, wall thickening or surrounding fluid. Patient reports lower abdominal pain, but reports pain no worse with food. No N/V tolerating a diet  #Hepatitis C antibody + on 7/21/2024, HCV RNA, PCR pending.  Hepatic steatosis suggested on ultrasound.   "Platelets and INR WNL.  Albumin 3.2.  Patient reports a history of hepatitis C diagnosed last year, has not seen a hepatologist  # History of drug abuse - Urine tox positive for amphetamine, fentanyl, benzodiazepines, and cocaine        Plan  -consider MRCP, but patient states she would prefer any further GI evaluation outpatient and would rather go home to \"vape\"  -Follow-up with hepatologist for hepatitis C and to discuss possible outpatient MRCP for further evaluation of CBD dilation  -Continue to trend LFTs and CBC  -Continue supportive care per primary team  -HCV RNA, PCR pending.            Plan discussed with Dr. Petty  (Electronically signed byTANVIR Miller on 7/22/2024 at 11:47 AM)       Inpatient consult to Gastroenterology  Consult performed by: TANVIR Miller  Consult ordered by: Stephon CHAUDHARI MD        "

## 2024-07-22 NOTE — DISCHARGE SUMMARY
Discharge Diagnosis  Polysubstance abuse.  Drug overdose.  Intractable abdominal pain-improving.  Hepatitis C positive.  Hepatic steatosis.  Dilated common bile duct up to 1.1 cm-radiology recommended MRCP for further evaluation.  Incidental 3 mm pedunculated polyp within the gallbladder-no follow-up is suggested at this point in time      Hospital Course  Initially patient was admitted to ICU for polysubstance overdose.  U tox was positive for amphetamines, fentanyl, benzodiazepines and cocaine.  Hemodynamically stable overnight in ICU, patient transferred out of ICU on 7/21.  No signs of depression or suicidal ideation, as per the patient she was using drugs for recreation, willing to seek help for her drug abuse.  Started on regular diet.  Given the ultrasound abdomen findings of dilated common bile duct in the setting of hepatic steatosis and hepatitis C positive  Gastroenterology consult placed we will follow-up on recommendations.  Continue symptomatic treatment.    I received a message that patient planning to leave AGAINST MEDICAL ADVICE.  Went to see the patient to explain on the plan and await for GI consult.  By the time I went to see pt within 10 minutes, patient was already signed AMA papers and left the hospital.    Outpatient Follow-Up  No future appointments.      Stephon White MD

## 2024-07-22 NOTE — PROGRESS NOTES
Evi Centeno is a 34 y.o. female on day 2 of admission presenting with Accidental drug overdose, initial encounter.      Subjective   Patient is lying on the bed comfortably, alert awake oriented x 4 answering my questions appropriately.  Denies depression or suicidal ideation.  Denies any abdominal pain nausea or vomiting.  No fever chills or rigors.  No other significant overnight issues.    Objective     Last Recorded Vitals  BP (!) 147/95 (BP Location: Right arm, Patient Position: Lying) Comment: RN notified  Pulse 72   Temp 36.8 °C (98.2 °F) (Temporal)   Resp 18   Wt 67.9 kg (149 lb 11.1 oz)   SpO2 98%   Intake/Output last 3 Shifts:  No intake or output data in the 24 hours ending 07/22/24 1245    Admission Weight  Weight: 63.5 kg (140 lb) (07/20/24 1220)    Daily Weight  07/22/24 : 67.9 kg (149 lb 11.1 oz)    Image Results  US gallbladder  Narrative: Interpreted By:  Radha Layne,   STUDY:  US GALLBLADDER;  7/21/2024 5:38 pm      INDICATION:  Signs/Symptoms:abdominal pain r/o lavinia.      COMPARISON:  Same day CT of the abdomen and pelvis study.      ACCESSION NUMBER(S):  [id number]      ORDERING CLINICIAN:  JENNIFER MORAN      TECHNIQUE:  Multiple images of the right upper quadrant were obtained.  This  examination was interpreted at [facility].      FINDINGS:  LIVER:  The liver measures 14.7cm and is diffusely echogenic in appearance,  consistent with diffuse fatty infiltration. The resulting increased  beam attenuation thereby limiting evaluation of the liver for focal  lesions. Within the limitations, no focal lesions are seen.          GALLBLADDER:  The gallbladder is nondistended, and demonstrates no evidence of  gallstones, wall thickening or surrounding fluid. The gallbladder  wall thickness is 0.27cm. Sonographic Stratton's sign is negative per  technologist report. Small echogenic mural based focus measuring 3 mm  noted along the lateral  gallbladder wall. This likely represents a  small polyp.          BILE DUCTS:  Common bile duct is dilated measuring up to 1.1 cm in the proximal  portion.      PANCREAS:  The pancreas is poorly visualized due to overlying bowel gas.      RIGHT KIDNEY:  The right kidney measures 11.3cm in length. The renal cortical  echogenicity and thickness are within normal limits.  No  hydronephrosis or renal calculi are seen.      Impression: 1. Findings suggesting hepatic steatosis. Correlate with hepatic  function studies.  2. Dilation of the common bile duct in the proximal portion measuring  up to 1.1 cm which may be in part technique related. No intrahepatic  biliary dilation. However correlation is recommended with laboratory  values for common bile duct obstruction. MRCP without contrast may be  obtained for further evaluation if clinically warranted.  3. Incidental 3 mm pedunculated polyp within the gallbladder. No  follow-up is suggested for this per ultrasound society criteria.  Normal thickness of the gallbladder wall.      MACRO:  None      Signed by: Radha Layne 7/21/2024 6:58 PM  Dictation workstation:   [id number]  CT abdomen pelvis w IV contrast  Narrative: Interpreted By:  Sylvia Prabhakar,   STUDY:  CT ABDOMEN PELVIS W IV CONTRAST; 7/21/2024 10:21 am      INDICATION:  Signs/Symptoms:RLQ abdominal pain r/o appy.      COMPARISON:  None..      ACCESSION NUMBER(S):  [id number]      ORDERING CLINICIAN:  JENNIFER MORAN      TECHNIQUE:  Oral contrast was not administered. 75 ml Omnipaque 350 was injected  intravenously. CT of the Abdomen and Pelvis with intravenous contrast  was performed. Axial, sagittal and coronal reformatted images were  reviewed.      FINDINGS:  Lower Chest: There is patchy bibasilar airspace opacification  posteriorly. No pleural effusion.      Abdomen: Within the liver there are 3 tiny hypodensities measuring up  to 7 mm in size likely small hepatic cysts. No intrahepatic  biliary  distention. Common bile duct is slightly dilated measuring up to 8 mm  short axis. Gallbladder is contracted with possible wall thickening.  The spleen and pancreas are unremarkable. Kidneys show no  hydronephrosis or renal calculi. Artifact due to the patient's arms.      No sign of bowel obstruction. Bowel is not well delineated due to  lack of intra-abdominal fat. Fluid is seen in the right colon.      Scoliosis noted.      Pelvis: There is ascites in the pelvis. Urinary bladder shows mild  wall thickening. Uterus unremarkable. Appendix is not clearly seen.      Impression: 1. Patchy basilar airspace opacification bilaterally  2. A few tiny hepatic cysts incidentally noted  3. Questionable gallbladder wall thickening with mildly dilated  common bile duct  4. Small volume of ascites  5. Appendix can not be located.          Signed by: Sylvia Prabhakar 7/21/2024 11:36 AM  Dictation workstation:   [id number]  CT head wo IV contrast  Narrative: Interpreted By:  Sylvia Prabhakar,   STUDY:  CT HEAD WO IV CONTRAST;  7/21/2024 10:16 am      INDICATION:  Signs/Symptoms:lethargy r/o ICH.  Lethargy with overdose yesterday.      COMPARISON:  12/08/2019.      ACCESSION NUMBER(S):  [id number]      ORDERING CLINICIAN:  JENNIFER MORAN      TECHNIQUE:  CT axial images through the Brain were obtained without contrast.      All CT exams are performed with 1 or more of the following dose  reduction techniques: Automatic exposure control, adjustment of mA  and/or kv according to patient size, or use of iterative  reconstruction techniques.      FINDINGS:  There is no mass effect, hemorrhage, or infarct. The ventricles  appear normal. Gray-white differentiation is maintained.      The visualized paranasal sinuses appear clear. The visualized  portions of the orbits are unremarkable.      Impression: No acute intracranial abnormality.      MACRO:  None.      Signed by: Sylvia Prabhakar 7/21/2024 11:30 AM  Dictation workstation:    [id number]      Physical Exam  General: No distress  Neck: Supple.  HEENT: Normocephalic atraumatic pupils equal react light  Heart: S1-S2 heard no murmurs gallops regurgitation  Lungs: Clear to auscultation bilaterally no wheezing rales or rhonchi.  Abdomen: Nondistended nontender bowel sounds are present  Neuro: No focal deficits.    Relevant Results  Results for orders placed or performed during the hospital encounter of 07/20/24 (from the past 24 hour(s))   Protime-INR   Result Value Ref Range    Protime 10.4 9.8 - 12.8 seconds    INR 0.9 0.9 - 1.1   APTT   Result Value Ref Range    aPTT 32 27 - 38 seconds   CBC and Auto Differential   Result Value Ref Range    WBC 7.5 4.4 - 11.3 x10*3/uL    nRBC 0.0 0.0 - 0.0 /100 WBCs    RBC 3.68 (L) 4.00 - 5.20 x10*6/uL    Hemoglobin 10.6 (L) 12.0 - 16.0 g/dL    Hematocrit 32.4 (L) 36.0 - 46.0 %    MCV 88 80 - 100 fL    MCH 28.8 26.0 - 34.0 pg    MCHC 32.7 32.0 - 36.0 g/dL    RDW 13.6 11.5 - 14.5 %    Platelets 217 150 - 450 x10*3/uL    Neutrophils % 56.9 40.0 - 80.0 %    Immature Granulocytes %, Automated 0.1 0.0 - 0.9 %    Lymphocytes % 31.4 13.0 - 44.0 %    Monocytes % 8.3 2.0 - 10.0 %    Eosinophils % 2.9 0.0 - 6.0 %    Basophils % 0.4 0.0 - 2.0 %    Neutrophils Absolute 4.24 1.20 - 7.70 x10*3/uL    Immature Granulocytes Absolute, Automated 0.01 0.00 - 0.70 x10*3/uL    Lymphocytes Absolute 2.34 1.20 - 4.80 x10*3/uL    Monocytes Absolute 0.62 0.10 - 1.00 x10*3/uL    Eosinophils Absolute 0.22 0.00 - 0.70 x10*3/uL    Basophils Absolute 0.03 0.00 - 0.10 x10*3/uL   Comprehensive Metabolic Panel   Result Value Ref Range    Glucose 89 74 - 99 mg/dL    Sodium 137 136 - 145 mmol/L    Potassium 3.9 3.5 - 5.3 mmol/L    Chloride 106 98 - 107 mmol/L    Bicarbonate 28 21 - 32 mmol/L    Anion Gap 7 (L) 10 - 20 mmol/L    Urea Nitrogen 8 6 - 23 mg/dL    Creatinine 0.47 (L) 0.50 - 1.05 mg/dL    eGFR >90 >60 mL/min/1.73m*2    Calcium 8.5 (L) 8.6 - 10.3 mg/dL    Albumin 3.2 (L) 3.4 - 5.0  g/dL    Alkaline Phosphatase 61 33 - 110 U/L    Total Protein 5.8 (L) 6.4 - 8.2 g/dL    AST 30 9 - 39 U/L    Bilirubin, Total 0.3 0.0 - 1.2 mg/dL    ALT 37 7 - 45 U/L   SST TOP   Result Value Ref Range    Extra Tube Hold for add-ons.      US gallbladder    Result Date: 7/21/2024  Interpreted By:  Radha Layne, STUDY: US GALLBLADDER;  7/21/2024 5:38 pm   INDICATION: Signs/Symptoms:abdominal pain r/o lavinia.   COMPARISON: Same day CT of the abdomen and pelvis study.   ACCESSION NUMBER(S): [id number]   ORDERING CLINICIAN: JENNIFER MORAN   TECHNIQUE: Multiple images of the right upper quadrant were obtained.  This examination was interpreted at [facility].   FINDINGS: LIVER: The liver measures 14.7cm and is diffusely echogenic in appearance, consistent with diffuse fatty infiltration. The resulting increased beam attenuation thereby limiting evaluation of the liver for focal lesions. Within the limitations, no focal lesions are seen.     GALLBLADDER: The gallbladder is nondistended, and demonstrates no evidence of gallstones, wall thickening or surrounding fluid. The gallbladder wall thickness is 0.27cm. Sonographic Stratton's sign is negative per technologist report. Small echogenic mural based focus measuring 3 mm noted along the lateral gallbladder wall. This likely represents a small polyp.     BILE DUCTS: Common bile duct is dilated measuring up to 1.1 cm in the proximal portion.   PANCREAS: The pancreas is poorly visualized due to overlying bowel gas.   RIGHT KIDNEY: The right kidney measures 11.3cm in length. The renal cortical echogenicity and thickness are within normal limits.  No hydronephrosis or renal calculi are seen.       1. Findings suggesting hepatic steatosis. Correlate with hepatic function studies. 2. Dilation of the common bile duct in the proximal portion measuring up to 1.1 cm which may be in part technique related. No intrahepatic biliary dilation.  However correlation is recommended with laboratory values for common bile duct obstruction. MRCP without contrast may be obtained for further evaluation if clinically warranted. 3. Incidental 3 mm pedunculated polyp within the gallbladder. No follow-up is suggested for this per ultrasound society criteria. Normal thickness of the gallbladder wall.   MACRO: None   Signed by: Radha Layne 7/21/2024 6:58 PM Dictation workstation:   [id number]    CT abdomen pelvis w IV contrast    Result Date: 7/21/2024  Interpreted By:  Sylvia Prabhakar, STUDY: CT ABDOMEN PELVIS W IV CONTRAST; 7/21/2024 10:21 am   INDICATION: Signs/Symptoms:RLQ abdominal pain r/o appy.   COMPARISON: None..   ACCESSION NUMBER(S): [id number]   ORDERING CLINICIAN: JENNIFER MORAN   TECHNIQUE: Oral contrast was not administered. 75 ml Omnipaque 350 was injected intravenously. CT of the Abdomen and Pelvis with intravenous contrast was performed. Axial, sagittal and coronal reformatted images were reviewed.   FINDINGS: Lower Chest: There is patchy bibasilar airspace opacification posteriorly. No pleural effusion.   Abdomen: Within the liver there are 3 tiny hypodensities measuring up to 7 mm in size likely small hepatic cysts. No intrahepatic biliary distention. Common bile duct is slightly dilated measuring up to 8 mm short axis. Gallbladder is contracted with possible wall thickening. The spleen and pancreas are unremarkable. Kidneys show no hydronephrosis or renal calculi. Artifact due to the patient's arms.   No sign of bowel obstruction. Bowel is not well delineated due to lack of intra-abdominal fat. Fluid is seen in the right colon.   Scoliosis noted.   Pelvis: There is ascites in the pelvis. Urinary bladder shows mild wall thickening. Uterus unremarkable. Appendix is not clearly seen.       1. Patchy basilar airspace opacification bilaterally 2. A few tiny hepatic cysts incidentally noted 3. Questionable gallbladder wall thickening with mildly  dilated common bile duct 4. Small volume of ascites 5. Appendix can not be located.     Signed by: Sylvia Prabhakar 7/21/2024 11:36 AM Dictation workstation:   [id number]    CT head wo IV contrast    Result Date: 7/21/2024  Interpreted By:  Sylvia Prabhakar, STUDY: CT HEAD WO IV CONTRAST;  7/21/2024 10:16 am   INDICATION: Signs/Symptoms:lethargy r/o ICH.  Lethargy with overdose yesterday.   COMPARISON: 12/08/2019.   ACCESSION NUMBER(S): [id number]   ORDERING CLINICIAN: JENNIFER MORAN   TECHNIQUE: CT axial images through the Brain were obtained without contrast.   All CT exams are performed with 1 or more of the following dose reduction techniques: Automatic exposure control, adjustment of mA and/or kv according to patient size, or use of iterative reconstruction techniques.   FINDINGS: There is no mass effect, hemorrhage, or infarct. The ventricles appear normal. Gray-white differentiation is maintained.   The visualized paranasal sinuses appear clear. The visualized portions of the orbits are unremarkable.       No acute intracranial abnormality.   MACRO: None.   Signed by: Sylvia Prabhakar 7/21/2024 11:30 AM Dictation workstation:   [id number]    Electrocardiogram, 12-lead    Result Date: 7/20/2024  Normal sinus rhythm Normal ECG When compared with ECG of 19-NOV-2019 15:16, No significant change was found See ED provider note for full interpretation and clinical correlation Confirmed by Taylor Pace ([id number]) on 7/20/2024 1:52:06 PM          Assessment/Plan   Polysubstance abuse.  Drug overdose.  Intractable abdominal pain-improving.  Hepatitis C positive.  Hepatic steatosis.  Dilated common bile duct up to 1.1 cm-radiology recommended MRCP for further evaluation.  Incidental 3 mm pedunculated polyp within the gallbladder-no follow-up is suggested at this point in time    Plan:  Initially patient was admitted to ICU for polysubstance overdose.  U tox was positive for amphetamines, fentanyl, benzodiazepines and  cocaine.  Hemodynamically stable overnight in ICU, patient transferred out of ICU on 7/21.  No signs of depression or suicidal ideation, as per the patient she was using drugs for recreation, willing to seek help for her drug abuse.  Started on regular diet.  Given the ultrasound abdomen findings of dilated common bile duct in the setting of hepatic steatosis and hepatitis C positive  Gastroenterology consult placed we will follow-up on recommendations.  Continue symptomatic treatment.  Follow-up CBC BMP ordered.    Disposition:  Awaiting gastroenterology clearance and clinical improvement for possible discharge in next 24 hours    Stephon White MD

## 2024-07-22 NOTE — NURSING NOTE
0800- Pt refusing bed alarm, ambulating to restroom     1600- Pt leaving AMA despite education, both I Vs and tele removed, vape pen returned,  notified

## 2024-07-22 NOTE — NURSING NOTE
Pt agreed to have Vape locked up in med room.   Pt educated on dangers/hazards of vaping.  ESPERANZA Lauren updated.

## 2024-07-23 LAB
HCV RNA SERPL NAA+PROBE-ACNC: ABNORMAL IU/ML
HCV RNA SERPL NAA+PROBE-ACNC: DETECTED K[IU]/ML
HCV RNA SERPL NAA+PROBE-LOG IU: 5.83 LOG IU/ML

## 2024-07-23 NOTE — DOCUMENTATION CLARIFICATION NOTE
"    PATIENT:               HAYLEY DUNLAP  ACCT #:                  [id number]  MRN:                       [id number]  :                       1990  ADMIT DATE:       2024 12:22 PM  DISCH DATE:        2024 4:10 PM  RESPONDING PROVIDER #:        [id number]          PROVIDER RESPONSE TEXT:    Toxic encephalopathy 2/2 Drug Overdose    CDI QUERY TEXT:    Clarification    Instruction:    Based on your assessment of the patient and the clinical information, please provide the requested documentation by clicking on the appropriate radio button and enter any additional information if prompted.    Question: Please further clarify the most likely etiology of the altered mental status as    When answering this query, please exercise your independent professional judgment. The fact that a question is being asked, does not imply that any particular answer is desired or expected.    The patient's clinical indicators include:  Clinical Information:  The patient is a 34 year old female who presented with a drug overdose.  Her PMH includes substance abuse.    Clinical Indicators:    ED Note  \"Pt was brought in by [facility]  for drug overdose.  Pt  arrived to ED obtunded, responsive only to painful stimuli.\"  \"patient upon arrival was very somnolent but following commands admit to using drugs was given Narcan 2 mg and is awake alert to yawning stating that she was arrested because she was using dope, she was using fentanyl to get high.\"  \"Patient after getting Narcan was more awake and alert stated that she does do for recreation at this time plan is to observe the patient get a drug screen toxicology panel and lab work and will reevaluate in an hour.  Patient's toxin was positive for benzos amphetamines cocaine and fentanyl, after patient got the 2 mg of Narcan in the ED she was awake and alert and then started dozing off and then became sleeping with shallow respirations was given another 2 mg of Narcan x 2 and " "after every time the Narcan.  Patient gets very somnolent shallow respirations poorly responsive at this time has been several hours at 540 to be given another 2 mg of IV Narcan patient woke right up decision was made to admit the patient on a Narcan drip she lacks capacity and will be admitted to the hospital.\"    HP 7/20 \"She was given Narcan 3 times in the emergency department.  She initially improved however became more somnolent and so was placed on a Narcan IV infusion.\"    ICU PN 7/21 \"Patient sitting up in bed this am eating breakfast. Patient complaining of a headache, dizziness, and stomach pain.\"    Treatment:  IV Narcan drip, IVP Narcan x4 in ED    Risk Factors:  Drug overdose  Options provided:  -- Toxic encephalopathy 2/2 Drug Overdose  -- Other - I will add my own diagnosis  -- Refer to Clinical Documentation Reviewer    Query created by: Mary Alice Zuluaga on 7/22/2024 12:08 PM      Electronically signed by:  RADHA STEVENSON-CNP 7/23/2024 8:43 AM          "

## 2025-02-08 ENCOUNTER — HOSPITAL ENCOUNTER (EMERGENCY)
Age: 35
Discharge: LEFT BEFORE DISCHARGE | End: 2025-02-08
Attending: STUDENT IN AN ORGANIZED HEALTH CARE EDUCATION/TRAINING PROGRAM

## 2025-02-08 ENCOUNTER — APPOINTMENT (OUTPATIENT)
Dept: GENERAL RADIOLOGY | Age: 35
End: 2025-02-08

## 2025-02-08 VITALS
DIASTOLIC BLOOD PRESSURE: 93 MMHG | BODY MASS INDEX: 24.91 KG/M2 | OXYGEN SATURATION: 100 % | HEART RATE: 80 BPM | SYSTOLIC BLOOD PRESSURE: 131 MMHG | WEIGHT: 145.1 LBS | TEMPERATURE: 98.1 F | RESPIRATION RATE: 13 BRPM

## 2025-02-08 LAB
AMPHET UR QL SCN: POSITIVE
BARBITURATES UR QL SCN: ABNORMAL
BENZODIAZ UR QL SCN: ABNORMAL
BILIRUB UR QL STRIP: NEGATIVE
CANNABINOIDS UR QL SCN: POSITIVE
CLARITY UR: CLEAR
COCAINE UR QL SCN: POSITIVE
COLOR UR: YELLOW
DRUG SCREEN COMMENT UR-IMP: ABNORMAL
FENTANYL SCREEN, URINE: POSITIVE
GLUCOSE UR STRIP-MCNC: NEGATIVE MG/DL
HCG, URINE, POC: NEGATIVE
HGB UR QL STRIP: NEGATIVE
INFLUENZA A BY PCR: NEGATIVE
INFLUENZA B BY PCR: NEGATIVE
KETONES UR STRIP-MCNC: NEGATIVE MG/DL
LEUKOCYTE ESTERASE UR QL STRIP: NEGATIVE
Lab: NORMAL
METHADONE UR QL SCN: ABNORMAL
NEGATIVE QC PASS/FAIL: NORMAL
NITRITE UR QL STRIP: NEGATIVE
OPIATES UR QL SCN: POSITIVE
OXYCODONE UR QL SCN: ABNORMAL
PCP UR QL SCN: ABNORMAL
PH UR STRIP: 6.5 [PH] (ref 5–9)
POSITIVE QC PASS/FAIL: NORMAL
PROPOXYPH UR QL SCN: ABNORMAL
PROT UR STRIP-MCNC: ABNORMAL MG/DL
SARS-COV-2 RDRP RESP QL NAA+PROBE: NOT DETECTED
SP GR UR STRIP: 1.03 (ref 1–1.03)
URINE REFLEX TO CULTURE: ABNORMAL
UROBILINOGEN UR STRIP-ACNC: 1 E.U./DL

## 2025-02-08 PROCEDURE — 81003 URINALYSIS AUTO W/O SCOPE: CPT

## 2025-02-08 PROCEDURE — 87635 SARS-COV-2 COVID-19 AMP PRB: CPT

## 2025-02-08 PROCEDURE — 99283 EMERGENCY DEPT VISIT LOW MDM: CPT

## 2025-02-08 PROCEDURE — 80307 DRUG TEST PRSMV CHEM ANLYZR: CPT

## 2025-02-08 PROCEDURE — 87502 INFLUENZA DNA AMP PROBE: CPT

## 2025-02-08 RX ORDER — GUAIFENESIN/DEXTROMETHORPHAN 100-10MG/5
5 SYRUP ORAL ONCE
Status: DISCONTINUED | OUTPATIENT
Start: 2025-02-08 | End: 2025-02-08 | Stop reason: HOSPADM

## 2025-02-08 ASSESSMENT — ENCOUNTER SYMPTOMS
COUGH: 1
ABDOMINAL DISTENTION: 0
EYE DISCHARGE: 0
CHEST TIGHTNESS: 0
RHINORRHEA: 1
DIARRHEA: 0
VOMITING: 0
NAUSEA: 0
WHEEZING: 0
EYE ITCHING: 0
COLOR CHANGE: 0
ABDOMINAL PAIN: 0
PHOTOPHOBIA: 0
EYE PAIN: 0
EYE REDNESS: 0

## 2025-02-08 ASSESSMENT — PAIN - FUNCTIONAL ASSESSMENT: PAIN_FUNCTIONAL_ASSESSMENT: NONE - DENIES PAIN

## 2025-02-09 NOTE — ED PROVIDER NOTES
[facility] EMERGENCY DEPARTMENT  EMERGENCY DEPARTMENT ENCOUNTER      Pt Name: Jess Diaz  MRN: [id number]  Birthdate 1990  Date of evaluation: 2/8/2025  Provider: Nic Martinez MD  2:05 AM    CHIEF COMPLAINT       Chief Complaint   Patient presents with    Drug Overdose     Brought by [facility] and [facility]          HISTORY OF PRESENT ILLNESS    Jess Diaz is a 34 y.o. female who presents to the emergency department multisubstance abuse, elevated BP     HPI  Patient is a 34-year-old female presenting to ED due to concern for multisubstance abuse.  Patient was brought in in police custody.  Patient is not currently under arrest although police are at bedside.  I got history from both police and patient.  She is fully alert and oriented x 4 with a GCS of 15.  She endorses that she uses methamphetamine every other day or so by smoking it.  She used some earlier today.  She also smoked some fentanyl about an hour and a half ago.  She never lost consciousness.  She was at a [employer] and locked herself in the bathroom.  She was refusing to come out.  [facility], called the police as they were concerned that she was overdosing potentially.  Police arrived along with EMS given the initial call out for potential overdose.  Her blood pressure systolically was high, I did not know exactly what it was as EMS staff was not available in the ED after they dropped her off by the time I evaluated patient.  Currently her blood pressure systolically is in the 120s.  Patient denies any lightheadedness, dizziness, headache or vision changes.  She denies any chest pain, shortness of breath or palpitations.  She has never lost consciousness while in police and EMS custody.  She is not interested at this time for substance abuse management although she did want to speak with lets get real as outpatient.  She did have some runny nose and congestion and coughing and did want a chest x-ray along with a COVID and flu

## 2025-02-09 NOTE — ED TRIAGE NOTES
Patient transferred to ER bed 6. Safety parameters in place, patient oriented to room, vitals assessed.

## (undated) DEVICE — GOWN,AURORA,NONRNF,XL,30/CS: Brand: MEDLINE

## (undated) DEVICE — TOWEL,OR,DSP,ST,BLUE,STD,4/PK,20PK/CS: Brand: MEDLINE

## (undated) DEVICE — NEEDLE HYPO 25GA L1.5IN BLU POLYPR HUB S STL REG BVL STR

## (undated) DEVICE — YANKAUER,BULB TIP,W/O VENT,RIGID,STERILE: Brand: MEDLINE

## (undated) DEVICE — SPONGE,LAP,18"X18",DLX,XR,ST,5/PK,40/PK: Brand: MEDLINE

## (undated) DEVICE — ELECTRODE PT RET AD L9FT HI MOIST COND ADH HYDRGEL CORDED

## (undated) DEVICE — 3M™ STERI-STRIP™ REINFORCED ADHESIVE SKIN CLOSURES, R1547, 1/2 IN X 4 IN (12 MM X 100 MM), 6 STRIPS/ENVELOPE: Brand: 3M™ STERI-STRIP™

## (undated) DEVICE — 4-PORT MANIFOLD: Brand: NEPTUNE 2

## (undated) DEVICE — SYRINGE IRRIG 60ML SFT PLIABLE BLB EZ TO GRP 1 HND USE W/

## (undated) DEVICE — GLOVE ORANGE PI 7 1/2   MSG9075

## (undated) DEVICE — COUNTER NDL 40 COUNT HLD 70 FOAM BLK ADH W/ MAG

## (undated) DEVICE — PACK,LAPAROTOMY,NO GOWNS: Brand: MEDLINE

## (undated) DEVICE — SYRINGE MED 10ML LUERLOCK TIP W/O SFTY DISP

## (undated) DEVICE — LABEL MED MINI W/ MARKER

## (undated) DEVICE — SUTURE MCRYL SZ 4-0 L27IN ABSRB UD L19MM PS-2 1/2 CIR PRIM Y426H

## (undated) DEVICE — APPLICATOR MEDICATED 26 CC SOLUTION HI LT ORNG CHLORAPREP

## (undated) DEVICE — SUTURE VCRL + SZ 3-0 L36IN ABSRB UD L36MM CT-1 1/2 CIR VCP944H

## (undated) DEVICE — TUBING, SUCTION, 1/4" X 10', STRAIGHT: Brand: MEDLINE

## (undated) DEVICE — GOWN,AURORA,NONREINFORCED,LARGE: Brand: MEDLINE

## (undated) DEVICE — PENCIL SMK EVAC 10 FT BLADE ELECTRD ROCKER FOR TELSCP

## (undated) DEVICE — MARKER SURG SKIN GENTIAN VLT REG TIP W/ 6IN RUL